# Patient Record
Sex: MALE | Race: WHITE | NOT HISPANIC OR LATINO | Employment: FULL TIME | URBAN - METROPOLITAN AREA
[De-identification: names, ages, dates, MRNs, and addresses within clinical notes are randomized per-mention and may not be internally consistent; named-entity substitution may affect disease eponyms.]

---

## 2017-01-10 ENCOUNTER — GENERIC CONVERSION - ENCOUNTER (OUTPATIENT)
Dept: OTHER | Facility: OTHER | Age: 57
End: 2017-01-10

## 2017-01-11 ENCOUNTER — GENERIC CONVERSION - ENCOUNTER (OUTPATIENT)
Dept: OTHER | Facility: OTHER | Age: 57
End: 2017-01-11

## 2017-01-11 LAB
A/G RATIO (HISTORICAL): 1.6 (ref 1.1–2.5)
ALBUMIN SERPL BCP-MCNC: 4.4 G/DL (ref 3.5–5.5)
ALP SERPL-CCNC: 79 IU/L (ref 39–117)
ALT SERPL W P-5'-P-CCNC: 35 IU/L (ref 0–44)
AMBIG ABBREV CMP14 DEFAULT (HISTORICAL): NORMAL
AMBIG ABBREV LP DEFAULT (HISTORICAL): NORMAL
AST SERPL W P-5'-P-CCNC: 29 IU/L (ref 0–40)
BILIRUB SERPL-MCNC: 0.6 MG/DL (ref 0–1.2)
BUN SERPL-MCNC: 13 MG/DL (ref 6–24)
BUN/CREA RATIO (HISTORICAL): 19 (ref 9–20)
CALCIUM SERPL-MCNC: 9.4 MG/DL (ref 8.7–10.2)
CHLORIDE SERPL-SCNC: 98 MMOL/L (ref 96–106)
CHOLEST SERPL-MCNC: 209 MG/DL (ref 100–199)
CO2 SERPL-SCNC: 22 MMOL/L (ref 18–29)
CREAT SERPL-MCNC: 0.68 MG/DL (ref 0.76–1.27)
DEPRECATED RDW RBC AUTO: 12.8 % (ref 12.3–15.4)
EGFR AFRICAN AMERICAN (HISTORICAL): 123 ML/MIN/1.73
EGFR-AMERICAN CALC (HISTORICAL): 106 ML/MIN/1.73
GLUCOSE SERPL-MCNC: 121 MG/DL (ref 65–99)
HCT VFR BLD AUTO: 49 % (ref 37.5–51)
HDLC SERPL-MCNC: 66 MG/DL
HGB BLD-MCNC: 16.5 G/DL (ref 12.6–17.7)
LDLC SERPL CALC-MCNC: 124 MG/DL (ref 0–99)
MCH RBC QN AUTO: 32.7 PG (ref 26.6–33)
MCHC RBC AUTO-ENTMCNC: 33.7 G/DL (ref 31.5–35.7)
MCV RBC AUTO: 97 FL (ref 79–97)
PLATELET # BLD AUTO: 233 X10E3/UL (ref 150–379)
POTASSIUM SERPL-SCNC: 4.3 MMOL/L (ref 3.5–5.2)
RBC (HISTORICAL): 5.05 X10E6/UL (ref 4.14–5.8)
SODIUM SERPL-SCNC: 136 MMOL/L (ref 134–144)
TOT. GLOBULIN, SERUM (HISTORICAL): 2.7 G/DL (ref 1.5–4.5)
TOTAL PROTEIN (HISTORICAL): 7.1 G/DL (ref 6–8.5)
TRIGL SERPL-MCNC: 95 MG/DL (ref 0–149)
VLDLC SERPL CALC-MCNC: 19 MG/DL (ref 5–40)
WBC # BLD AUTO: 6.8 X10E3/UL (ref 3.4–10.8)

## 2017-01-12 LAB
LYME 18 KD IGG (HISTORICAL): ABNORMAL
LYME 23 KD IGG (HISTORICAL): ABNORMAL
LYME 23 KD IGM (HISTORICAL): ABNORMAL
LYME 28 KD IGG (HISTORICAL): ABNORMAL
LYME 30 KD IGG (HISTORICAL): ABNORMAL
LYME 39 KD IGG (HISTORICAL): ABNORMAL
LYME 39 KD IGM (HISTORICAL): ABNORMAL
LYME 41 KD IGG (HISTORICAL): PRESENT
LYME 41 KD IGM (HISTORICAL): PRESENT
LYME 45 KD IGG (HISTORICAL): ABNORMAL
LYME 58 KD IGG (HISTORICAL): ABNORMAL
LYME 66 KD IGG (HISTORICAL): ABNORMAL
LYME 93 KD IGG (HISTORICAL): ABNORMAL
LYME IGG WB INTERP. (HISTORICAL): NEGATIVE
LYME IGG/IGM AB (HISTORICAL): <0.91 ISR (ref 0–0.9)
LYME IGM (HISTORICAL): <0.8 INDEX (ref 0–0.79)
LYME IGM WB INTERP. (HISTORICAL): NEGATIVE
PROSTATE SPECIFIC ANTIGEN (HISTORICAL): 2.7 NG/ML (ref 0–4)
TSH SERPL DL<=0.05 MIU/L-ACNC: 3.2 UIU/ML (ref 0.45–4.5)

## 2017-05-12 DIAGNOSIS — E66.9 OBESITY: ICD-10-CM

## 2017-05-12 DIAGNOSIS — R73.01 IMPAIRED FASTING GLUCOSE: ICD-10-CM

## 2018-07-24 ENCOUNTER — OFFICE VISIT (OUTPATIENT)
Dept: FAMILY MEDICINE CLINIC | Facility: CLINIC | Age: 58
End: 2018-07-24
Payer: COMMERCIAL

## 2018-07-24 VITALS
WEIGHT: 189.4 LBS | BODY MASS INDEX: 31.04 KG/M2 | SYSTOLIC BLOOD PRESSURE: 148 MMHG | TEMPERATURE: 97.9 F | DIASTOLIC BLOOD PRESSURE: 92 MMHG | RESPIRATION RATE: 16 BRPM | HEART RATE: 84 BPM

## 2018-07-24 DIAGNOSIS — L30.9 DERMATITIS: Primary | ICD-10-CM

## 2018-07-24 PROCEDURE — 99213 OFFICE O/P EST LOW 20 MIN: CPT | Performed by: NURSE PRACTITIONER

## 2018-07-24 PROCEDURE — 1036F TOBACCO NON-USER: CPT | Performed by: NURSE PRACTITIONER

## 2018-07-24 RX ORDER — PREDNISONE 10 MG/1
TABLET ORAL
Qty: 30 TABLET | Refills: 0 | Status: SHIPPED | OUTPATIENT
Start: 2018-07-24 | End: 2019-06-19

## 2018-07-24 NOTE — PROGRESS NOTES
FAMILY PRACTICE OFFICE VISIT       NAME: Amadeo Goetz  AGE: 60 y o  SEX: male       : 1960        MRN: [id number]    DATE: 2018    Assessment and Plan     Problem List Items Addressed This Visit     None      Visit Diagnoses     Dermatitis    -  Primary    Relevant Medications    predniSONE 10 mg tablet          1  Dermatitis  predniSONE 10 mg tablet     Dermatitis on knees, arms, and torso, appears allergic in origin  Recommend course of prednisone taper 40 mg for 3 days, 30 mg for 3 days, 20 mg for 3 days, and 10 mg for 3 days  May use an OTC antihistamine such as Zyrtec, Claritin, or Benadryl, and add OTC Pepcid or Zantac for itching  If rash worsens or does not resolve, instructed to call  Chief Complaint     Chief Complaint   Patient presents with    Rash     Patient is here c/o swelling and a red itchy rash on his legs, arms and torso x's 2+ wks  History of Present Illness     Patient presents today with a red, itching rash on his knees, thighs, arms, and torso  Rash began on his knees two weeks ago, and has spread  He works at a [employer]  This is a new job for him, started 4 months ago  The winery sprays fungicides, insecticides, and sulfur regularly on the jossie  He feels he may be having an allergic response an ingredient in one of these sprays  The [employer] will continue to use these sprays until harvest, in September or October  He has been using Benadryl tablets and topical intermittently  He is looking for new employment opportunities  He has not used any new products at home  Review of Systems   Review of Systems   Constitutional: Negative for chills, fatigue and fever  Respiratory: Negative  Cardiovascular: Negative  Gastrointestinal: Negative  Musculoskeletal: Negative  Skin: Positive for rash  Neurological: Negative          Active Problem List     Patient Active Problem List   Diagnosis    Disc degeneration, lumbar    Fatty liver    Hypertension    Impaired fasting glucose       Past Medical History:  No past medical history on file  Past Surgical History:  Past Surgical History:   Procedure Laterality Date    HERNIA REPAIR      Last Assessed:  12/29/16       Family History:  Family History   Problem Relation Age of Onset    Breast cancer Mother     Other Mother         Laryngeal cancer    Colon cancer Family        Social History:  Social History     Social History    Marital status: /Civil Union     Spouse name: N/A    Number of children: N/A    Years of education: N/A     Occupational History    Not on file  Social History Main Topics    Smoking status: Never Smoker    Smokeless tobacco: Never Used    Alcohol use Yes      Comment: social    Drug use: No    Sexual activity: Not on file     Other Topics Concern    Not on file     Social History Narrative    No narrative on file       I have reviewed the patient's medical history in detail; there are no changes to the history as noted in the electronic medical record  Objective     Vitals:    07/24/18 1633   BP: 148/92   Pulse: 84   Resp: 16   Temp: 97 9 °F (36 6 °C)   TempSrc: Tympanic   Weight: 85 9 kg (189 lb 6 4 oz)     Wt Readings from Last 3 Encounters:   07/24/18 85 9 kg (189 lb 6 4 oz)   12/29/16 85 kg (187 lb 6 1 oz)   07/29/13 86 7 kg (191 lb 4 oz)     Body mass index is 31 04 kg/m²  Physical Exam   Constitutional: He appears well-developed and well-nourished  No distress  HENT:   Head: Normocephalic and atraumatic  Cardiovascular: Normal rate, regular rhythm and normal heart sounds  Pulmonary/Chest: Effort normal and breath sounds normal    Skin: Rash (diffuse erythematous confluent macular papular rash on arms, knees, and torso  ) noted  Psychiatric: He has a normal mood and affect  Nursing note and vitals reviewed        ALLERGIES:  No Known Allergies    Current Medications     Current Outpatient Prescriptions   Medication Sig Dispense Refill    predniSONE 10 mg tablet Take 4 tablets for 3 days, 3 tablets for 3 days, 2 tablets for 3 days, 1 tablet for 3 days  30 tablet 0     No current facility-administered medications for this visit  Health Maintenance   There are no preventive care reminders to display for this patient  There is no immunization history on file for this patient      Jenifer Ross

## 2018-07-26 PROBLEM — R73.01 IMPAIRED FASTING GLUCOSE: Status: ACTIVE | Noted: 2017-01-12

## 2019-06-19 ENCOUNTER — TRANSCRIBE ORDERS (OUTPATIENT)
Dept: LAB | Facility: CLINIC | Age: 59
End: 2019-06-19

## 2019-06-19 ENCOUNTER — OFFICE VISIT (OUTPATIENT)
Dept: FAMILY MEDICINE CLINIC | Facility: CLINIC | Age: 59
End: 2019-06-19
Payer: COMMERCIAL

## 2019-06-19 ENCOUNTER — APPOINTMENT (OUTPATIENT)
Dept: LAB | Facility: CLINIC | Age: 59
End: 2019-06-19
Payer: COMMERCIAL

## 2019-06-19 VITALS
RESPIRATION RATE: 16 BRPM | DIASTOLIC BLOOD PRESSURE: 86 MMHG | OXYGEN SATURATION: 97 % | HEART RATE: 83 BPM | HEIGHT: 67 IN | BODY MASS INDEX: 29.88 KG/M2 | SYSTOLIC BLOOD PRESSURE: 158 MMHG | WEIGHT: 190.4 LBS | TEMPERATURE: 98.4 F

## 2019-06-19 DIAGNOSIS — Z12.5 ENCOUNTER FOR PROSTATE CANCER SCREENING: ICD-10-CM

## 2019-06-19 DIAGNOSIS — J32.9 SINUSITIS, UNSPECIFIED CHRONICITY, UNSPECIFIED LOCATION: ICD-10-CM

## 2019-06-19 DIAGNOSIS — K76.0 FATTY LIVER: ICD-10-CM

## 2019-06-19 DIAGNOSIS — I10 ESSENTIAL HYPERTENSION: ICD-10-CM

## 2019-06-19 DIAGNOSIS — L23.9 ALLERGIC DERMATITIS: Primary | ICD-10-CM

## 2019-06-19 DIAGNOSIS — R73.01 IMPAIRED FASTING GLUCOSE: ICD-10-CM

## 2019-06-19 LAB
ALBUMIN SERPL BCP-MCNC: 4.1 G/DL (ref 3.5–5)
ALP SERPL-CCNC: 97 U/L (ref 46–116)
ALT SERPL W P-5'-P-CCNC: 64 U/L (ref 12–78)
ANION GAP SERPL CALCULATED.3IONS-SCNC: 5 MMOL/L (ref 4–13)
AST SERPL W P-5'-P-CCNC: 33 U/L (ref 5–45)
BASOPHILS # BLD AUTO: 0.05 THOUSANDS/ΜL (ref 0–0.1)
BASOPHILS NFR BLD AUTO: 1 % (ref 0–1)
BILIRUB SERPL-MCNC: 0.59 MG/DL (ref 0.2–1)
BUN SERPL-MCNC: 14 MG/DL (ref 5–25)
CALCIUM SERPL-MCNC: 9.1 MG/DL (ref 8.3–10.1)
CHLORIDE SERPL-SCNC: 103 MMOL/L (ref 100–108)
CHOLEST SERPL-MCNC: 184 MG/DL (ref 50–200)
CO2 SERPL-SCNC: 27 MMOL/L (ref 21–32)
CREAT SERPL-MCNC: 0.84 MG/DL (ref 0.6–1.3)
EOSINOPHIL # BLD AUTO: 0.04 THOUSAND/ΜL (ref 0–0.61)
EOSINOPHIL NFR BLD AUTO: 1 % (ref 0–6)
ERYTHROCYTE [DISTWIDTH] IN BLOOD BY AUTOMATED COUNT: 11.7 % (ref 11.6–15.1)
EST. AVERAGE GLUCOSE BLD GHB EST-MCNC: 140 MG/DL
GFR SERPL CREATININE-BSD FRML MDRD: 95 ML/MIN/1.73SQ M
GLUCOSE SERPL-MCNC: 129 MG/DL (ref 65–140)
HBA1C MFR BLD: 6.5 % (ref 4.2–6.3)
HCT VFR BLD AUTO: 47.7 % (ref 36.5–49.3)
HDLC SERPL-MCNC: 69 MG/DL (ref 40–60)
HGB BLD-MCNC: 16.5 G/DL (ref 12–17)
IMM GRANULOCYTES # BLD AUTO: 0.03 THOUSAND/UL (ref 0–0.2)
IMM GRANULOCYTES NFR BLD AUTO: 0 % (ref 0–2)
LDLC SERPL CALC-MCNC: 108 MG/DL (ref 0–100)
LYMPHOCYTES # BLD AUTO: 1.84 THOUSANDS/ΜL (ref 0.6–4.47)
LYMPHOCYTES NFR BLD AUTO: 25 % (ref 14–44)
MCH RBC QN AUTO: 33.4 PG (ref 26.8–34.3)
MCHC RBC AUTO-ENTMCNC: 34.6 G/DL (ref 31.4–37.4)
MCV RBC AUTO: 97 FL (ref 82–98)
MONOCYTES # BLD AUTO: 0.62 THOUSAND/ΜL (ref 0.17–1.22)
MONOCYTES NFR BLD AUTO: 9 % (ref 4–12)
NEUTROPHILS # BLD AUTO: 4.68 THOUSANDS/ΜL (ref 1.85–7.62)
NEUTS SEG NFR BLD AUTO: 64 % (ref 43–75)
NRBC BLD AUTO-RTO: 0 /100 WBCS
PLATELET # BLD AUTO: 222 THOUSANDS/UL (ref 149–390)
PMV BLD AUTO: 10.4 FL (ref 8.9–12.7)
POTASSIUM SERPL-SCNC: 4.4 MMOL/L (ref 3.5–5.3)
PROT SERPL-MCNC: 8.1 G/DL (ref 6.4–8.2)
PSA SERPL-MCNC: 2.3 NG/ML (ref 0–4)
RBC # BLD AUTO: 4.94 MILLION/UL (ref 3.88–5.62)
SODIUM SERPL-SCNC: 135 MMOL/L (ref 136–145)
TRIGL SERPL-MCNC: 37 MG/DL
TSH SERPL DL<=0.05 MIU/L-ACNC: 1.97 UIU/ML (ref 0.36–3.74)
WBC # BLD AUTO: 7.26 THOUSAND/UL (ref 4.31–10.16)

## 2019-06-19 PROCEDURE — 99213 OFFICE O/P EST LOW 20 MIN: CPT | Performed by: FAMILY MEDICINE

## 2019-06-19 PROCEDURE — G0103 PSA SCREENING: HCPCS

## 2019-06-19 PROCEDURE — 85025 COMPLETE CBC W/AUTO DIFF WBC: CPT

## 2019-06-19 PROCEDURE — 3008F BODY MASS INDEX DOCD: CPT | Performed by: FAMILY MEDICINE

## 2019-06-19 PROCEDURE — 36415 COLL VENOUS BLD VENIPUNCTURE: CPT

## 2019-06-19 PROCEDURE — 80053 COMPREHEN METABOLIC PANEL: CPT

## 2019-06-19 PROCEDURE — 84443 ASSAY THYROID STIM HORMONE: CPT

## 2019-06-19 PROCEDURE — 80061 LIPID PANEL: CPT

## 2019-06-19 PROCEDURE — 83036 HEMOGLOBIN GLYCOSYLATED A1C: CPT

## 2019-06-19 RX ORDER — FLUTICASONE PROPIONATE 50 MCG
2 SPRAY, SUSPENSION (ML) NASAL DAILY
Qty: 1 BOTTLE | Refills: 2 | Status: SHIPPED | OUTPATIENT
Start: 2019-06-19 | End: 2021-03-22

## 2019-06-19 RX ORDER — MOMETASONE FUROATE 1 MG/ML
SOLUTION TOPICAL 2 TIMES DAILY
Qty: 60 ML | Refills: 1 | Status: SHIPPED | OUTPATIENT
Start: 2019-06-19 | End: 2021-03-22

## 2019-06-19 RX ORDER — LEVOCETIRIZINE DIHYDROCHLORIDE 5 MG/1
5 TABLET, FILM COATED ORAL EVERY EVENING
Qty: 30 TABLET | Refills: 2 | Status: SHIPPED | OUTPATIENT
Start: 2019-06-19 | End: 2021-03-22

## 2019-06-19 RX ORDER — AMOXICILLIN 875 MG/1
875 TABLET, COATED ORAL 2 TIMES DAILY
Qty: 20 TABLET | Refills: 0 | Status: SHIPPED | OUTPATIENT
Start: 2019-06-19 | End: 2019-06-29

## 2019-07-24 DIAGNOSIS — Z12.11 SCREENING FOR COLON CANCER: Primary | ICD-10-CM

## 2021-03-22 ENCOUNTER — OFFICE VISIT (OUTPATIENT)
Dept: FAMILY MEDICINE CLINIC | Facility: CLINIC | Age: 61
End: 2021-03-22
Payer: COMMERCIAL

## 2021-03-22 VITALS
WEIGHT: 192.8 LBS | RESPIRATION RATE: 16 BRPM | DIASTOLIC BLOOD PRESSURE: 82 MMHG | BODY MASS INDEX: 30.98 KG/M2 | HEART RATE: 86 BPM | TEMPERATURE: 97.8 F | OXYGEN SATURATION: 98 % | SYSTOLIC BLOOD PRESSURE: 126 MMHG | HEIGHT: 66 IN

## 2021-03-22 DIAGNOSIS — Z12.11 SCREENING FOR COLON CANCER: ICD-10-CM

## 2021-03-22 DIAGNOSIS — K76.0 FATTY LIVER: ICD-10-CM

## 2021-03-22 DIAGNOSIS — Z12.5 ENCOUNTER FOR PROSTATE CANCER SCREENING: ICD-10-CM

## 2021-03-22 DIAGNOSIS — I10 ESSENTIAL HYPERTENSION: ICD-10-CM

## 2021-03-22 DIAGNOSIS — Z00.00 ENCOUNTER FOR HEALTH MAINTENANCE EXAMINATION IN ADULT: Primary | ICD-10-CM

## 2021-03-22 DIAGNOSIS — R29.898 HAND WEAKNESS: ICD-10-CM

## 2021-03-22 DIAGNOSIS — R73.01 IMPAIRED FASTING GLUCOSE: ICD-10-CM

## 2021-03-22 DIAGNOSIS — S76.211A INGUINAL STRAIN, RIGHT, INITIAL ENCOUNTER: ICD-10-CM

## 2021-03-22 PROCEDURE — 3008F BODY MASS INDEX DOCD: CPT | Performed by: FAMILY MEDICINE

## 2021-03-22 PROCEDURE — 99396 PREV VISIT EST AGE 40-64: CPT | Performed by: FAMILY MEDICINE

## 2021-03-22 PROCEDURE — 1036F TOBACCO NON-USER: CPT | Performed by: FAMILY MEDICINE

## 2021-03-22 PROCEDURE — 3725F SCREEN DEPRESSION PERFORMED: CPT | Performed by: FAMILY MEDICINE

## 2021-03-22 RX ORDER — MELOXICAM 15 MG/1
TABLET ORAL
Qty: 30 TABLET | Refills: 1 | Status: SHIPPED | OUTPATIENT
Start: 2021-03-22 | End: 2022-06-07 | Stop reason: ALTCHOICE

## 2021-03-22 NOTE — ASSESSMENT & PLAN NOTE
· No medications  · Blood pressure is normal during exam today    · Continue home monitoring, weight loss, exercise, lifestyle changes

## 2021-03-22 NOTE — ASSESSMENT & PLAN NOTE
· Hemoglobin A1c 6 5 back in June of 2019  · Stable weight  · No family history of diabetes    · Patient will proceed with repeat blood work now

## 2021-03-22 NOTE — PROGRESS NOTES
FAMILY PRACTICE OFFICE VISIT       NAME: Noemy Mendoza  AGE: 57 y o  SEX: male       : 1960        MRN: [id number]        Assessment and Plan     Problem List Items Addressed This Visit        Digestive    Fatty liver    Relevant Orders    Comprehensive metabolic panel    Lipid panel       Endocrine    Impaired fasting glucose     · Hemoglobin A1c 6 5 back in 2019  · Stable weight  · No family history of diabetes  · Patient will proceed with repeat blood work now         Relevant Orders    Hemoglobin A1C       Cardiovascular and Mediastinum    Hypertension     · No medications  · Blood pressure is normal during exam today  · Continue home monitoring, weight loss, exercise, lifestyle changes         Relevant Orders    TSH, 3rd generation    CBC and differential      Other Visit Diagnoses     Encounter for health maintenance examination in adult    -  Primary    Relevant Orders    PSA, Total Screen    Hand weakness        Relevant Orders    EMG 2 Limb Upper Extremity    Encounter for prostate cancer screening        Screening for colon cancer        Relevant Orders    Ambulatory referral to Colorectal Surgery    Inguinal strain, right, initial encounter        Relevant Medications    meloxicam (MOBIC) 15 mg tablet      Patient presents for annual well exam   Assessment and plan as outlined above  ·  Patient will proceed with blood work  · Patient is due for routine colonoscopy, referral provided  · Patient reports intermittent symptoms of hand weakness, no significant arthritic changes on exam today, his symptoms could be related to carpal tunnel syndrome, he will proceed with EMG if symptoms persist  · Right groin strain:  No evidence of hernia on exam   Trial of meloxicam 15 mg daily for 10-14 days and then as needed  We discussed importance of avoiding of lifting which is heart to achieve with patient's job responsibilities    He will do his best   If symptoms persist-he will contact me and will proceed with further evaluation  · We discussed importance of weight loss, healthy diet especially in the light of history of borderline hemoglobin A1c of 6 5 in the past     BMI Counseling: Body mass index is 31 12 kg/m²  The BMI is above normal  Nutrition recommendations include encouraging healthy choices of fruits and vegetables, consuming healthier snacks, moderation in carbohydrate intake and reducing intake of cholesterol  Exercise recommendations include exercising 3-5 times per week  No pharmacotherapy was ordered  There are no Patient Instructions on file for this visit  Discussed with the patient and all questioned fully answered  He will call me if any problems arise  M*Modal software was used to dictate this note  It may contain errors with dictating incorrect words/spelling  Please contact provider directly with any questions  Chief Complaint     Chief Complaint   Patient presents with    Annual Exam     possible hernia        History of Present Illness     Patient presents for annual well exam   He is complaining of intermittent pain in the right groin area, 2 months, on and off, no abdominal pain, nausea vomiting, diarrhea or dyspepsia  Normal bowel movements  He denies symptoms of dysuria or hematuria  He has not appreciate any bulge or hernia  Symptoms come and go  Patient reports we can not urinary stream within past few years  Occasional symptoms of nocturia when he wakes up once a night  Patient admits to recurrent heavy lifting at work  He denies symptoms of hip pain or low back pain  Pain is usually localized in the mid right groin area and does not radiate elsewhere  Patient works at [employer] and will be planning to proceed with [place] vaccination through work  Patient has been monitoring blood pressures at home, usually they are well controlled  Most recent blood work performed in June of 2019 revealing hemoglobin A1c of 6 5    Patient denies family history of diabetes  His weight has been stable within past few years  Patient admits to arthritic changes in his fingers and hands, occasionally he has noticed weakness in his hands with opening the jars  He denies symptoms of numbness, tingling or paresthesias, no nocturnal discomfort or numbness  Review of Systems   Review of Systems   Constitutional: Negative  HENT: Negative  Eyes: Negative  Respiratory: Negative  Cardiovascular: Negative for chest pain, palpitations and leg swelling  Gastrointestinal: Negative for abdominal pain, blood in stool, constipation and diarrhea  Endocrine: Negative  Genitourinary: Negative  Musculoskeletal: Positive for arthralgias  Skin: Negative  Allergic/Immunologic: Negative  Neurological: Positive for weakness (Bilateral hands)  Hematological: Negative  Psychiatric/Behavioral: Negative  Active Problem List     Patient Active Problem List   Diagnosis    Disc degeneration, lumbar    Fatty liver    Hypertension    Impaired fasting glucose       Past Medical History:  History reviewed  No pertinent past medical history      Past Surgical History:  Past Surgical History:   Procedure Laterality Date    HERNIA REPAIR      Last Assessed:  12/29/16       Family History:  Family History   Problem Relation Age of Onset    Breast cancer Mother     Other Mother         Laryngeal cancer    Colon cancer Family        Social History:  Social History     Socioeconomic History    Marital status: /Civil Union     Spouse name: Not on file    Number of children: Not on file    Years of education: Not on file    Highest education level: Not on file   Occupational History    Not on file   Social Needs    Financial resource strain: Not on file    Food insecurity     Worry: Not on file     Inability: Not on file    Transportation needs     Medical: Not on file     Non-medical: Not on file   Tobacco Use    Smoking status: Never Smoker    Smokeless tobacco: Never Used   Substance and Sexual Activity    Alcohol use: Yes     Comment: social    Drug use: No    Sexual activity: Not on file   Lifestyle    Physical activity     Days per week: Not on file     Minutes per session: Not on file    Stress: Not on file   Relationships    Social connections     Talks on phone: Not on file     Gets together: Not on file     Attends [employer] service: Not on file     Active member of club or organization: Not on file     Attends meetings of clubs or organizations: Not on file     Relationship status: Not on file    Intimate partner violence     Fear of current or ex partner: Not on file     Emotionally abused: Not on file     Physically abused: Not on file     Forced sexual activity: Not on file   Other Topics Concern    Not on file   Social History Narrative    Not on file           Objective     Vitals:    03/22/21 0724 03/22/21 0754   BP: 136/84 126/82   BP Location: Left arm    Patient Position: Sitting    Cuff Size: Large    Pulse: 86    Resp: 16    Temp: 97 8 °F (36 6 °C)    TempSrc: Temporal    SpO2: 98%    Weight: 87 5 kg (192 lb 12 8 oz)    Height: 5' 6" (1 676 m)      Wt Readings from Last 3 Encounters:   03/22/21 87 5 kg (192 lb 12 8 oz)   06/19/19 86 4 kg (190 lb 6 4 oz)   07/24/18 85 9 kg (189 lb 6 4 oz)       Physical Exam  Vitals signs and nursing note reviewed  Constitutional:       General: He is not in acute distress  Appearance: Normal appearance  He is well-developed  He is obese  He is not ill-appearing  HENT:      Head: Normocephalic and atraumatic  Eyes:      Conjunctiva/sclera: Conjunctivae normal    Neck:      Musculoskeletal: Neck supple  Vascular: No carotid bruit  Cardiovascular:      Rate and Rhythm: Normal rate and regular rhythm  Heart sounds: Normal heart sounds  No murmur  Pulmonary:      Effort: Pulmonary effort is normal  No respiratory distress        Breath sounds: Normal breath sounds  No wheezing or rales  Abdominal:      General: Abdomen is flat  Bowel sounds are normal  There is no distension or abdominal bruit  Palpations: There is no mass  Tenderness: There is no abdominal tenderness  There is no guarding  Hernia: No hernia is present  Musculoskeletal: Normal range of motion  Neurological:      Mental Status: He is alert and oriented to person, place, and time  Cranial Nerves: No cranial nerve deficit  Psychiatric:         Mood and Affect: Mood normal          Behavior: Behavior normal          Thought Content:  Thought content normal          Pertinent Laboratory/Diagnostic Studies:  Lab Results   Component Value Date    GLUCOSE 121 (H) 01/11/2017    BUN 14 06/19/2019    CREATININE 0 84 06/19/2019    CALCIUM 9 1 06/19/2019     01/11/2017    K 4 4 06/19/2019    CO2 27 06/19/2019     06/19/2019     Lab Results   Component Value Date    ALT 64 06/19/2019    AST 33 06/19/2019    ALKPHOS 97 06/19/2019    BILITOT 0 6 01/11/2017       Lab Results   Component Value Date    WBC 7 26 06/19/2019    HGB 16 5 06/19/2019    HCT 47 7 06/19/2019    MCV 97 06/19/2019     06/19/2019       No results found for: TSH    Lab Results   Component Value Date    CHOL 209 (H) 01/11/2017     Lab Results   Component Value Date    TRIG 37 06/19/2019     Lab Results   Component Value Date    HDL 69 (H) 06/19/2019     Lab Results   Component Value Date    LDLCALC 108 (H) 06/19/2019     Lab Results   Component Value Date    HGBA1C 6 5 (H) 06/19/2019       Results for orders placed or performed in visit on 03/10/21   Novel Coronavirus (COVID-19), PCR [facility]    Specimen: Nose; Nares   Result Value Ref Range    SARS-CoV-2 Negative Negative       Orders Placed This Encounter   Procedures    Comprehensive metabolic panel    Lipid panel    TSH, 3rd generation    PSA, Total Screen    CBC and differential    Hemoglobin A1C    Ambulatory referral to Colorectal Surgery    EMG 2 Limb Upper Extremity       ALLERGIES:  No Known Allergies    Current Medications     Current Outpatient Medications   Medication Sig Dispense Refill    meloxicam (MOBIC) 15 mg tablet Take 1 tablet once a day after food as needed for groin pain/joint pain 30 tablet 1     No current facility-administered medications for this visit  Medications Discontinued During This Encounter   Medication Reason    mometasone (ELOCON) 0 1 % lotion     levocetirizine (XYZAL) 5 MG tablet     fluticasone (FLONASE) 50 mcg/act nasal spray        Health Maintenance     Health Maintenance   Topic Date Due    Hepatitis C Screening  Never done    HIV Screening  Never done    BMI: Followup Plan  Never done    Annual Physical  Never done    DTaP,Tdap,and Td Vaccines (1 - Tdap) Never done    Colorectal Cancer Screening  Never done    COVID-19 Vaccine (2 - Pfizer 2-dose series) 02/17/2021    Influenza Vaccine (1) 06/30/2021 (Originally 9/1/2020)    Depression Screening PHQ  03/22/2022    BMI: Adult  03/22/2022    Pneumococcal Vaccine: Pediatrics (0 to 5 Years) and At-Risk Patients (6 to 59 Years)  Aged Out    HIB Vaccine  Aged Out    Hepatitis B Vaccine  Aged Out    IPV Vaccine  Aged Out    Hepatitis A Vaccine  Aged Out    Meningococcal ACWY Vaccine  Aged Out    HPV Vaccine  Aged Out       There is no immunization history for the selected administration types on file for this patient      Gisselle Menezes MD

## 2021-03-25 ENCOUNTER — OCCMED (OUTPATIENT)
Dept: URGENT CARE | Facility: CLINIC | Age: 61
End: 2021-03-25
Payer: OTHER MISCELLANEOUS
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
## 2021-03-25 PROCEDURE — 99283 EMERGENCY DEPT VISIT LOW MDM: CPT | Performed by: PHYSICIAN ASSISTANT

## 2021-03-25 PROCEDURE — G0382 LEV 3 HOSP TYPE B ED VISIT: HCPCS | Performed by: PHYSICIAN ASSISTANT

## 2021-03-29 ENCOUNTER — APPOINTMENT (OUTPATIENT)
Dept: URGENT CARE | Facility: CLINIC | Age: 61
End: 2021-03-29

## 2021-03-29 ENCOUNTER — APPOINTMENT (OUTPATIENT)
Dept: LAB | Facility: CLINIC | Age: 61
End: 2021-03-29
Payer: COMMERCIAL

## 2021-03-29 DIAGNOSIS — Z00.00 ENCOUNTER FOR HEALTH MAINTENANCE EXAMINATION IN ADULT: ICD-10-CM

## 2021-03-29 DIAGNOSIS — K76.0 FATTY LIVER: ICD-10-CM

## 2021-03-29 DIAGNOSIS — R73.01 IMPAIRED FASTING GLUCOSE: ICD-10-CM

## 2021-03-29 DIAGNOSIS — I10 ESSENTIAL HYPERTENSION: ICD-10-CM

## 2021-03-29 LAB
ALBUMIN SERPL BCP-MCNC: 3.8 G/DL (ref 3.5–5)
ALP SERPL-CCNC: 94 U/L (ref 46–116)
ALT SERPL W P-5'-P-CCNC: 52 U/L (ref 12–78)
ANION GAP SERPL CALCULATED.3IONS-SCNC: 5 MMOL/L (ref 4–13)
AST SERPL W P-5'-P-CCNC: 28 U/L (ref 5–45)
BASOPHILS # BLD AUTO: 0.05 THOUSANDS/ΜL (ref 0–0.1)
BASOPHILS NFR BLD AUTO: 1 % (ref 0–1)
BILIRUB SERPL-MCNC: 0.8 MG/DL (ref 0.2–1)
BUN SERPL-MCNC: 15 MG/DL (ref 5–25)
CALCIUM SERPL-MCNC: 8.7 MG/DL (ref 8.3–10.1)
CHLORIDE SERPL-SCNC: 106 MMOL/L (ref 100–108)
CHOLEST SERPL-MCNC: 172 MG/DL (ref 50–200)
CO2 SERPL-SCNC: 27 MMOL/L (ref 21–32)
CREAT SERPL-MCNC: 0.76 MG/DL (ref 0.6–1.3)
EOSINOPHIL # BLD AUTO: 0.15 THOUSAND/ΜL (ref 0–0.61)
EOSINOPHIL NFR BLD AUTO: 2 % (ref 0–6)
ERYTHROCYTE [DISTWIDTH] IN BLOOD BY AUTOMATED COUNT: 11.7 % (ref 11.6–15.1)
EST. AVERAGE GLUCOSE BLD GHB EST-MCNC: 160 MG/DL
GFR SERPL CREATININE-BSD FRML MDRD: 98 ML/MIN/1.73SQ M
GLUCOSE P FAST SERPL-MCNC: 161 MG/DL (ref 65–99)
HBA1C MFR BLD: 7.2 %
HCT VFR BLD AUTO: 46.8 % (ref 36.5–49.3)
HDLC SERPL-MCNC: 62 MG/DL
HGB BLD-MCNC: 16 G/DL (ref 12–17)
IMM GRANULOCYTES # BLD AUTO: 0.03 THOUSAND/UL (ref 0–0.2)
IMM GRANULOCYTES NFR BLD AUTO: 0 % (ref 0–2)
LDLC SERPL CALC-MCNC: 97 MG/DL (ref 0–100)
LYMPHOCYTES # BLD AUTO: 2.71 THOUSANDS/ΜL (ref 0.6–4.47)
LYMPHOCYTES NFR BLD AUTO: 37 % (ref 14–44)
MCH RBC QN AUTO: 32.5 PG (ref 26.8–34.3)
MCHC RBC AUTO-ENTMCNC: 34.2 G/DL (ref 31.4–37.4)
MCV RBC AUTO: 95 FL (ref 82–98)
MONOCYTES # BLD AUTO: 0.71 THOUSAND/ΜL (ref 0.17–1.22)
MONOCYTES NFR BLD AUTO: 10 % (ref 4–12)
NEUTROPHILS # BLD AUTO: 3.74 THOUSANDS/ΜL (ref 1.85–7.62)
NEUTS SEG NFR BLD AUTO: 50 % (ref 43–75)
NONHDLC SERPL-MCNC: 110 MG/DL
NRBC BLD AUTO-RTO: 0 /100 WBCS
PLATELET # BLD AUTO: 217 THOUSANDS/UL (ref 149–390)
PMV BLD AUTO: 10.5 FL (ref 8.9–12.7)
POTASSIUM SERPL-SCNC: 4.3 MMOL/L (ref 3.5–5.3)
PROT SERPL-MCNC: 7.5 G/DL (ref 6.4–8.2)
PSA SERPL-MCNC: 2.8 NG/ML (ref 0–4)
RBC # BLD AUTO: 4.93 MILLION/UL (ref 3.88–5.62)
SODIUM SERPL-SCNC: 138 MMOL/L (ref 136–145)
TRIGL SERPL-MCNC: 65 MG/DL
TSH SERPL DL<=0.05 MIU/L-ACNC: 2.95 UIU/ML (ref 0.36–3.74)
WBC # BLD AUTO: 7.39 THOUSAND/UL (ref 4.31–10.16)

## 2021-03-29 PROCEDURE — 84443 ASSAY THYROID STIM HORMONE: CPT

## 2021-03-29 PROCEDURE — 85025 COMPLETE CBC W/AUTO DIFF WBC: CPT

## 2021-03-29 PROCEDURE — 99213 OFFICE O/P EST LOW 20 MIN: CPT

## 2021-03-29 PROCEDURE — 80061 LIPID PANEL: CPT

## 2021-03-29 PROCEDURE — G0103 PSA SCREENING: HCPCS

## 2021-03-29 PROCEDURE — 36415 COLL VENOUS BLD VENIPUNCTURE: CPT

## 2021-03-29 PROCEDURE — 83036 HEMOGLOBIN GLYCOSYLATED A1C: CPT

## 2021-03-29 PROCEDURE — 80053 COMPREHEN METABOLIC PANEL: CPT

## 2021-03-30 ENCOUNTER — TELEPHONE (OUTPATIENT)
Dept: FAMILY MEDICINE CLINIC | Facility: CLINIC | Age: 61
End: 2021-03-30

## 2021-03-30 DIAGNOSIS — R73.01 IMPAIRED FASTING GLUCOSE: Primary | ICD-10-CM

## 2021-03-30 NOTE — TELEPHONE ENCOUNTER
I reviewed results of blood work  Elevated fasting blood glucose at 161 and hemoglobin A1c of 7 2  Rest of the blood work including CBC, CMP, lipid panel, TSH and PSA were normal   I left message for patient  last nightto call me back to review

## 2021-04-02 ENCOUNTER — OCCMED (OUTPATIENT)
Dept: URGENT CARE | Facility: CLINIC | Age: 61
End: 2021-04-02
Payer: OTHER MISCELLANEOUS
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
## 2021-04-02 PROCEDURE — 99213 OFFICE O/P EST LOW 20 MIN: CPT | Performed by: PHYSICIAN ASSISTANT

## 2021-04-07 ENCOUNTER — APPOINTMENT (OUTPATIENT)
Dept: URGENT CARE | Facility: CLINIC | Age: 61
End: 2021-04-07
Payer: OTHER MISCELLANEOUS

## 2021-04-07 PROCEDURE — 99213 OFFICE O/P EST LOW 20 MIN: CPT

## 2021-04-08 ENCOUNTER — EVALUATION (OUTPATIENT)
Dept: PHYSICAL THERAPY | Facility: CLINIC | Age: 61
End: 2021-04-08
Payer: OTHER MISCELLANEOUS

## 2021-04-08 DIAGNOSIS — M75.41 IMPINGEMENT SYNDROME OF RIGHT SHOULDER: Primary | ICD-10-CM

## 2021-04-08 DIAGNOSIS — M25.511 ACUTE PAIN OF RIGHT SHOULDER: ICD-10-CM

## 2021-04-08 PROCEDURE — 97161 PT EVAL LOW COMPLEX 20 MIN: CPT

## 2021-04-08 NOTE — TELEPHONE ENCOUNTER
Patient made aware of result note and providers instructions  Patient verbalized understanding       Patient states he can be reached at [phone] after 5 pm     Dr Cross please review and advise

## 2021-04-08 NOTE — TELEPHONE ENCOUNTER
I spoke with patient regarding blood work results  All blood work is excellent aside from hemoglobin A1c of 7 2  Patient is determined to lose weight, start healthy diet and improve his numbers with lifestyle changes  He is reluctant to take any medications  I advised him to schedule diabetic eye exam and repeat blood work in 3 months  Patient understands instructions and agrees

## 2021-04-08 NOTE — PROGRESS NOTES
PT Evaluation     Today's date: 2021  Patient name: Mayra Mcmanus  : 1960  MRN: [id number]  Referring provider: Kimberlyn Child MD  Dx:   Encounter Diagnosis     ICD-10-CM    1  Impingement syndrome of right shoulder  M75 41    2  Acute pain of right shoulder  M25 511        Start Time: 1745  Stop Time: 1830  Total time in clinic (min): 45 minutes    Assessment  Assessment details: Pt is a 57 y o  male presenting to PT with acute R shoulder pain s/p attempting to lift pallet  PT findings include: positive impingement special tests as well as painful arc sign  Pt with no significant tenderness to palpation of posterior RTC, however does present with painful and weak resisted isometric of infraspinatus and teres minor  Pt likely with RTC involvement in pain  Cannot rule out ligament involvement however less likely due to no subjective of instability, more likely strain  Pt will benefit from skilled PT to address above impairments to return to PLOF with less restriction and to reach functional goals  Impairments: impaired physical strength, lacks appropriate home exercise program and pain with function  Functional limitations: pain with lifting, pain with reahcing overhead  Pain with pushing/working UE ADLs  Symptom irritability: moderateUnderstanding of Dx/Px/POC: good   Prognosis: good    Goals  1  Pt will demonstrate understanding of HEP and POC in order to improve compliance with course of rehab in 2 weeks  2  Pt will demonstrate awareness of appropriate posture with seated, standing and functional dynamic reaching activities in order to decrease excessive loads/pain with ADL's in 3 weeks  3  Pt will demonstrate 5/5 ER strength in order for pt to perform overhead work activities with less restriction by d/c    4  Pt will demonstrate negative painful arc sign so that pt can perform lifting activities without restriction by d/c       Plan  Patient would benefit from: skilled physical therapy  Planned modality interventions: low level laser therapy  Planned therapy interventions: manual therapy, neuromuscular re-education, patient education, postural training, stretching, strengthening, therapeutic activities, therapeutic exercise and home exercise program  Frequency: 2x week  Duration in weeks: 4  Treatment plan discussed with: patient        Subjective Evaluation    History of Present Illness  Mechanism of injury: Pt reports that he works as  for [employer]  At work pt was lifting pallet and onset of sharp burning R shoulder pain into the bicep region  Pt states that he subsequently had diffficulty lifting arm without pain  Since that injury, he states slight improvement  Able to lift arms with pain but able to go overhead  Pt visited physician who was concerned as he had weakness with ER  Pt states that physician would like him to get MRI  Pt has yet to schedule MRI  He denies numbness/tingling  No longer gets burning pain in the anterior shoulder  Pt with prior injury to R shoulder in which he pulled bicep mm  About 25 years ago  Quality of life: good    Pain  Current pain ratin  At best pain ratin  At worst pain ratin  Location: R shoulder  Quality: sharp, burning, radiating, throbbing and dull ache  Relieving factors: relaxation and rest  Aggravating factors: lifting    Patient Goals  Patient goals for therapy: decreased pain, increased strength, independence with ADLs/IADLs, return to work and increased motion          Objective    ROM (R/L)  Shoulder Flex: WNL  Shoulder ABD: WNL  Shoulder ER: WNL  Shoulder IR: WNL    Joint Mobility (R/L):  GH Inferior: normal/normal  GH Posterior: normal/normal  GH Anterior: normal/normal    Palpation: +TTP anterior shoulder, bicipital groove region, intertuburcular area   +soft tissue tension R UT     Strength:  Shoulder Flex: 4/5 (pain); 5/5  Shoulder ABD: 4/5 (pain); 5/5  Shoulder IR: 4/5 (pain); 5/5  Shoulder ER: 3+/5 (pain); 5/5      Special Tests:  Neer +/-  Eugune Christians +/-  + painful arc sign     Precautions: None      Manuals 4/8            UT/levator STM             Laser                                        Neuro Re-Ed                                                                                                        Ther Ex             Isometric shoulder ER/IR/add HEP demo            Shoulder Row HEP demo blue            Shoulder Ext Hep demo blue            Bicep Curl             S/l ER             Shoulder IR             Serratus punch             Zoltan             Ther Activity             UBE                          Gait Training                                       Modalities

## 2021-04-08 NOTE — TELEPHONE ENCOUNTER
Please contact patient  I have been trying to region within past 10 days unsuccessfully and left him messages  His blood work reveals new onset of diabetes with hemoglobin A1c of 7 2  I do need to see him for a follow-up or discussed results over the phone  He either needs to schedule a follow-up appointment or give me an exact time after 5:00 p m  when I can reach him and discussed results  Please let me know      Thank you

## 2021-04-13 ENCOUNTER — OFFICE VISIT (OUTPATIENT)
Dept: PHYSICAL THERAPY | Facility: CLINIC | Age: 61
End: 2021-04-13
Payer: OTHER MISCELLANEOUS

## 2021-04-13 DIAGNOSIS — M25.511 ACUTE PAIN OF RIGHT SHOULDER: ICD-10-CM

## 2021-04-13 DIAGNOSIS — M75.41 IMPINGEMENT SYNDROME OF RIGHT SHOULDER: Primary | ICD-10-CM

## 2021-04-13 PROCEDURE — 97110 THERAPEUTIC EXERCISES: CPT

## 2021-04-13 PROCEDURE — 97140 MANUAL THERAPY 1/> REGIONS: CPT

## 2021-04-13 NOTE — PROGRESS NOTES
Daily Note     Today's date: 2021  Patient name: Brenda Palma  : 1960  MRN: [id number]  Referring provider: Frederick Andino MD  Dx:   Encounter Diagnosis     ICD-10-CM    1  Impingement syndrome of right shoulder  M75 41    2  Acute pain of right shoulder  M25 511                   Subjective: Pt reports no significant changes since IE  Pt has yet to schedule MRI due to wrong phone number provided  Pt states that he will contact physician and try to obtain MRI  Objective: See treatment diary below      Assessment: pt reports subjective pain relief with fwd UBE, therefore will only perform fwd NV  Pt continues with significant pain with ER, therefore only performed isometric and AROM in s/l  PT will continue to monitor  Plan: Continue per plan of care        Precautions: None      Manuals            UT/levator STM  DL           Laser   4' R shoulder                                     Neuro Re-Ed                                                                                                        Ther Ex             Isometric shoulder ER/IR/add HEP demo isometric ER only 10x5"            Shoulder Row HEP demo blue Apollo 40# 3x10           Shoulder Ext Hep demo blue Blue 3x10            Bicep Curl             S/l ER             Shoulder IR  Grn 3x10           Serratus punch             Pulley  Flex 15x 10"           Shrug  15x NV           Ther Activity             UBE  3'/3' (fwd only NV 6')                        Gait Training                                       Modalities

## 2021-04-15 ENCOUNTER — OFFICE VISIT (OUTPATIENT)
Dept: PHYSICAL THERAPY | Facility: CLINIC | Age: 61
End: 2021-04-15
Payer: OTHER MISCELLANEOUS

## 2021-04-15 DIAGNOSIS — M75.41 IMPINGEMENT SYNDROME OF RIGHT SHOULDER: Primary | ICD-10-CM

## 2021-04-15 DIAGNOSIS — M25.511 ACUTE PAIN OF RIGHT SHOULDER: ICD-10-CM

## 2021-04-15 PROCEDURE — 97140 MANUAL THERAPY 1/> REGIONS: CPT

## 2021-04-15 PROCEDURE — 97110 THERAPEUTIC EXERCISES: CPT

## 2021-04-15 NOTE — PROGRESS NOTES
Daily Note     Today's date: 4/15/2021  Patient name: Terrie Light  : 1960  MRN: [id number]  Referring provider: Terry Martinez MD  Dx:   Encounter Diagnosis     ICD-10-CM    1  Impingement syndrome of right shoulder  M75 41    2  Acute pain of right shoulder  M25 511                   Subjective: Pt reports that performing shoulder exercises last visit felt fairly good  He denies significant pain today, continues with weakness with ER  Objective: See treatment diary below      Assessment: Pt continues to tolerate exercises well with no complaints  Significant weakness continued with ER, but able to tolerate isometric walk outs  PT will continue to monitor and progress per pt ability  Plan: Continue per plan of care        Precautions: None      Manuals 4/8 4/13 4/15          UT/levator STM  DL DL          Laser   4' R shoulder 4' R shoulder           [address] stabilization   ER/IR @ 0° ABD 5" static holds 3x30"                       Neuro Re-Ed                                                                                                        Ther Ex             Isometric shoulder ER/IR/add HEP demo isometric ER only 10x5"            Isometric Tband walk outs   ER walk outs 1 step grn 10x          Shoulder Row HEP demo blue Apollo 40# 3x10 Apollo 40# 3x10          Shoulder Ext Hep demo blue Blue 3x10  Blue 3x10           Bicep Curl   8# 15x regular curls; 15x hammer curls          S/l ER             Shoulder IR  Grn 3x10 Grn 3x10          Serratus punch   2# 2x10          Pulley  Flex 15x 10" NV          Shrug  15x NV 15x          Ther Activity             UBE  3'/3' (fwd only NV 6') 6' fwd                       Gait Training                                       Modalities

## 2021-04-19 ENCOUNTER — APPOINTMENT (OUTPATIENT)
Dept: PHYSICAL THERAPY | Facility: CLINIC | Age: 61
End: 2021-04-19
Payer: OTHER MISCELLANEOUS

## 2021-04-22 ENCOUNTER — OFFICE VISIT (OUTPATIENT)
Dept: PHYSICAL THERAPY | Facility: CLINIC | Age: 61
End: 2021-04-22
Payer: OTHER MISCELLANEOUS

## 2021-04-22 DIAGNOSIS — M75.41 IMPINGEMENT SYNDROME OF RIGHT SHOULDER: Primary | ICD-10-CM

## 2021-04-22 DIAGNOSIS — M25.511 ACUTE PAIN OF RIGHT SHOULDER: ICD-10-CM

## 2021-04-22 PROCEDURE — 97110 THERAPEUTIC EXERCISES: CPT

## 2021-04-22 PROCEDURE — 97140 MANUAL THERAPY 1/> REGIONS: CPT

## 2021-04-22 NOTE — PROGRESS NOTES
Daily Note     Today's date: 2021  Patient name: Cony Truong  : 1960  MRN: [id number]  Referring provider: Vanessa Medina MD  Dx:   Encounter Diagnosis     ICD-10-CM    1  Impingement syndrome of right shoulder  M75 41    2  Acute pain of right shoulder  M25 511                   Subjective: Pt reports his R shoulder feels "tired" upon arrival to therapy  Notes "I did a lot of digging today "    Objective: See treatment diary below      Assessment: Performed exercise program w/o difficulty or discomfort  Responded well to manual therapies  Tolerated treatment well  Patient would benefit from continued PT      Plan: Continue per plan of care        Precautions: None      Manuals 4/8 4/13 4/15 4/22         UT/levator STM  DL DL MO         Laser   4' R shoulder 4' R shoulder  4' R shoulder         [facility] stabilization   ER/IR @ 0° ABD 5" static holds 3x30" ER/IR  @0* ABD5" static holds  3x30"                      Neuro Re-Ed                                                                                                        Ther Ex             Isometric shoulder ER/IR/add HEP demo isometric ER only 10x5"            Isometric Tband walk outs   ER walk outs 1 step grn 10x ER  Walk outs 1  Step  grn 10x         Shoulder Row HEP demo blue Apollo 40# 3x10 Apollo 40# 3x10 Apollo  40#  3x10         Shoulder Ext Hep demo blue Blue 3x10  Blue 3x10  Blue  3x10         Bicep Curl   8# 15x regular curls; 15x hammer curls 8# 15x  Regular curls,  15x  hammer curls         S/l ER             Shoulder IR  Grn 3x10 Grn 3x10 Grn  3x10         Serratus punch   2# 2x10 2#2x10         Pulley  Flex 15x 10" NV flx 15x10"         Shrug  15x NV 15x  15x         Ther Activity             UBE  3'/3' (fwd only NV 6') 6' fwd 6'  fwd                      Gait Training                                       Modalities

## 2021-04-26 ENCOUNTER — APPOINTMENT (OUTPATIENT)
Dept: PHYSICAL THERAPY | Facility: CLINIC | Age: 61
End: 2021-04-26
Payer: OTHER MISCELLANEOUS

## 2021-04-29 ENCOUNTER — APPOINTMENT (OUTPATIENT)
Dept: PHYSICAL THERAPY | Facility: CLINIC | Age: 61
End: 2021-04-29
Payer: OTHER MISCELLANEOUS

## 2021-05-10 ENCOUNTER — TRANSCRIBE ORDERS (OUTPATIENT)
Dept: ADMINISTRATIVE | Facility: HOSPITAL | Age: 61
End: 2021-05-10

## 2021-05-10 DIAGNOSIS — M75.41 IMPINGEMENT SYNDROME OF RIGHT SHOULDER: Primary | ICD-10-CM

## 2021-05-17 ENCOUNTER — OFFICE VISIT (OUTPATIENT)
Dept: URGENT CARE | Facility: CLINIC | Age: 61
End: 2021-05-17
Payer: COMMERCIAL

## 2021-05-17 VITALS
RESPIRATION RATE: 20 BRPM | OXYGEN SATURATION: 95 % | TEMPERATURE: 98.7 F | WEIGHT: 190 LBS | BODY MASS INDEX: 30.53 KG/M2 | HEIGHT: 66 IN | HEART RATE: 76 BPM | DIASTOLIC BLOOD PRESSURE: 86 MMHG | SYSTOLIC BLOOD PRESSURE: 140 MMHG

## 2021-05-17 DIAGNOSIS — H92.02 EARACHE ON LEFT: Primary | ICD-10-CM

## 2021-05-17 PROCEDURE — G0382 LEV 3 HOSP TYPE B ED VISIT: HCPCS | Performed by: PHYSICIAN ASSISTANT

## 2021-05-17 PROCEDURE — S9083 URGENT CARE CENTER GLOBAL: HCPCS | Performed by: PHYSICIAN ASSISTANT

## 2021-05-17 RX ORDER — FLUTICASONE PROPIONATE 50 MCG
1 SPRAY, SUSPENSION (ML) NASAL DAILY
Qty: 9.9 ML | Refills: 0 | Status: SHIPPED | OUTPATIENT
Start: 2021-05-17 | End: 2022-03-18

## 2021-05-17 RX ORDER — PREDNISONE 10 MG/1
TABLET ORAL
Qty: 21 TABLET | Refills: 0 | Status: SHIPPED | OUTPATIENT
Start: 2021-05-17 | End: 2021-05-28 | Stop reason: ALTCHOICE

## 2021-05-17 NOTE — PROGRESS NOTES
[facility]        NAME: Estela Granger is a 57 y o  male  : 1960    MRN: [id number]  DATE: May 17, 2021  TIME: 12:45 PM    Assessment and Plan   Earache on left [H92 02]  1  Earache on left  predniSONE 10 mg tablet    fluticasone (FLONASE) 50 mcg/act nasal spray         Patient Instructions     Take prednisone as directed   Use Flonase as directed   Monitor for worsening symptoms  Follow up with PCP in 3-5 days  Proceed to  ER if symptoms worsen  Chief Complaint     Chief Complaint   Patient presents with    ear blocked     c/o bilateral ear blockage (L>R) - with diminished hearing - x 1 week  Has tried OTC preparations and H2O2 with no success  History of Present Illness       Patient presents with complaint clogged sensation in the left ear for the past week  He notes associated muffled hearing in the left ear  He denies fever, chills, sweats, ear pain, and ear drainage  He reports intermittent congestion which seems to be worse at night  He denies sinus pressure, headache, and sinus pain  Patient reports history of allergic rhinitis but denies trying any palliative measures  He also notes intermittent popping in the left ear  Review of Systems   Review of Systems   Constitutional: Negative for chills and fever  HENT: Negative for congestion, ear discharge, ear pain and sore throat  Eyes: Negative for pain and visual disturbance  Respiratory: Negative for cough and shortness of breath  Cardiovascular: Negative for chest pain and palpitations  Gastrointestinal: Negative for abdominal pain and vomiting  Genitourinary: Negative for dysuria and hematuria  Musculoskeletal: Negative for arthralgias and back pain  Skin: Negative for color change and rash  Neurological: Negative for seizures and syncope  All other systems reviewed and are negative          Current Medications       Current Outpatient Medications:     fluticasone (FLONASE) 50 mcg/act nasal spray, 1 spray into each nostril daily, Disp: 9 9 mL, Rfl: 0    meloxicam (MOBIC) 15 mg tablet, Take 1 tablet once a day after food as needed for groin pain/joint pain, Disp: 30 tablet, Rfl: 1    predniSONE 10 mg tablet, Take 6 tabs on day 1, 5 tabs on day 2, 4 tabs on day 3, 3 tabs on day 4, 2 tabs on day 5, and 1 tab on day 6, Disp: 21 tablet, Rfl: 0    Current Allergies     Allergies as of 05/17/2021    (No Known Allergies)            The following portions of the patient's history were reviewed and updated as appropriate: allergies, current medications, past family history, past medical history, past social history, past surgical history and problem list      No past medical history on file  Past Surgical History:   Procedure Laterality Date    HERNIA REPAIR      Last Assessed:  12/29/16       Family History   Problem Relation Age of Onset    Breast cancer Mother     Other Mother         Laryngeal cancer    Colon cancer Family          Medications have been verified  Objective   /86   Pulse 76   Temp 98 7 °F (37 1 °C)   Resp 20   Ht 5' 6" (1 676 m)   Wt 86 2 kg (190 lb)   SpO2 95%   BMI 30 67 kg/m²   No LMP for male patient  Physical Exam     Physical Exam  Vitals signs and nursing note reviewed  Constitutional:       General: He is not in acute distress  Appearance: Normal appearance  He is well-developed  He is not ill-appearing or diaphoretic  HENT:      Head: Normocephalic and atraumatic  Right Ear: Tympanic membrane, ear canal and external ear normal  There is no impacted cerumen  Left Ear: Tympanic membrane, ear canal and external ear normal  There is no impacted cerumen  Nose: Nose normal    Eyes:      Conjunctiva/sclera: Conjunctivae normal       Pupils: Pupils are equal, round, and reactive to light  Neck:      Musculoskeletal: Normal range of motion and neck supple  Cardiovascular:      Rate and Rhythm: Normal rate and regular rhythm        Heart sounds: Normal heart sounds  Pulmonary:      Effort: Pulmonary effort is normal  No respiratory distress  Breath sounds: Normal breath sounds  No stridor  Lymphadenopathy:      Cervical: No cervical adenopathy  Skin:     General: Skin is warm and dry  Capillary Refill: Capillary refill takes less than 2 seconds  Findings: No rash  Neurological:      Mental Status: He is alert and oriented to person, place, and time  Cranial Nerves: No cranial nerve deficit  Sensory: No sensory deficit  Psychiatric:         Behavior: Behavior normal          Thought Content:  Thought content normal

## 2021-05-21 NOTE — PROGRESS NOTES
It has been ~30 days since last PT session  Pt has not followed up with no updated measures  Pt will be discharged from skilled PT at this time

## 2021-05-28 ENCOUNTER — OFFICE VISIT (OUTPATIENT)
Dept: FAMILY MEDICINE CLINIC | Facility: CLINIC | Age: 61
End: 2021-05-28
Payer: COMMERCIAL

## 2021-05-28 VITALS
TEMPERATURE: 97.8 F | SYSTOLIC BLOOD PRESSURE: 140 MMHG | DIASTOLIC BLOOD PRESSURE: 80 MMHG | WEIGHT: 193.5 LBS | BODY MASS INDEX: 31.1 KG/M2 | HEART RATE: 75 BPM | RESPIRATION RATE: 18 BRPM | OXYGEN SATURATION: 98 % | HEIGHT: 66 IN

## 2021-05-28 DIAGNOSIS — H69.82 DYSFUNCTION OF LEFT EUSTACHIAN TUBE: Primary | ICD-10-CM

## 2021-05-28 PROCEDURE — 1036F TOBACCO NON-USER: CPT | Performed by: NURSE PRACTITIONER

## 2021-05-28 PROCEDURE — 99213 OFFICE O/P EST LOW 20 MIN: CPT | Performed by: NURSE PRACTITIONER

## 2021-05-28 PROCEDURE — 3008F BODY MASS INDEX DOCD: CPT | Performed by: NURSE PRACTITIONER

## 2021-05-28 NOTE — PROGRESS NOTES
FAMILY PRACTICE OFFICE VISIT       NAME: Martinez Gutierrez  AGE: 57 y o  SEX: male       : 1960        MRN: [id number]    Assessment and Plan     Problem List Items Addressed This Visit     None      Visit Diagnoses     Dysfunction of left eustachian tube    -  Primary    Relevant Orders    Ambulatory Referral to Otolaryngology          1  Dysfunction of left eustachian tube  Ambulatory Referral to Otolaryngology     This 57-year-old male presents today for symptoms and exam consistent left eustachian tube dysfunction  Was evaluated at urgent care  prescribed a prednisone taper, which was not effective for relief of symptoms  He has used Flonase sporadically  Recommend continuing to use Flonase nasal spray 2 sprays each nostril once daily consistently on a daily basis  Also add an over-the-counter antihistamine such as Claritin, Zyrtec, or Allegra daily  Reviewed eustachian tube dysfunction often takes several weeks to clear  Recommend trial of these medications for 2-4 weeks, if symptoms are not improving or if symptoms should worsen, advised to follow-up with ear, nose, and throat specialist, contact information provided today  Chief Complaint     Chief Complaint   Patient presents with    sinues infection     x 2-3 week        History of Present Illness     Martinez Gutierrez is a 57year old male presenting today for left ear feeling clogged  Feels like there is a "shell" over his left ear  Ongoing for 3 weeks now  Was seen in urgent care on , and placed on prednisone, did not help  Right ear intermittently feels this way  No nasal congestion  No post nasal drip  No sore throat  No cough  No sneezing  No fever  Mild seasonal allergies, noted especially with sinus pressure in the evening  Sudafed caused racing heart rate in the past          Review of Systems   Review of Systems   Constitutional: Negative for chills, diaphoresis, fatigue and fever     HENT: Positive for sinus pressure  Negative for congestion, ear discharge, ear pain, postnasal drip, rhinorrhea, sneezing, sore throat and tinnitus  Sinus pain: in the evenings  Left ear as noted in HPI   Respiratory: Negative for cough and chest tightness  Cardiovascular: Negative  Neurological: Negative for headaches  Active Problem List     Patient Active Problem List   Diagnosis    Disc degeneration, lumbar    Fatty liver    Hypertension    Impaired fasting glucose       Past Medical History:  History reviewed  No pertinent past medical history      Past Surgical History:  Past Surgical History:   Procedure Laterality Date    HERNIA REPAIR      Last Assessed:  12/29/16       Family History:  Family History   Problem Relation Age of Onset    Breast cancer Mother     Other Mother         Laryngeal cancer    Colon cancer Family        Social History:  Social History     Socioeconomic History    Marital status: /Civil Union     Spouse name: Not on file    Number of children: Not on file    Years of education: Not on file    Highest education level: Not on file   Occupational History    Not on file   Social Needs    Financial resource strain: Not on file    Food insecurity     Worry: Not on file     Inability: Not on file   [employer] needs     Medical: Not on file     Non-medical: Not on file   Tobacco Use    Smoking status: Never Smoker    Smokeless tobacco: Never Used   Substance and Sexual Activity    Alcohol use: Yes     Comment: social    Drug use: No    Sexual activity: Not on file   Lifestyle    Physical activity     Days per week: Not on file     Minutes per session: Not on file    Stress: Not on file   Relationships    Social connections     Talks on phone: Not on file     Gets together: Not on file     Attends Restorationism service: Not on file     Active member of club or organization: Not on file     Attends meetings of clubs or organizations: Not on file     Relationship status: Not on file    Intimate partner violence     Fear of current or ex partner: Not on file     Emotionally abused: Not on file     Physically abused: Not on file     Forced sexual activity: Not on file   Other Topics Concern    Not on file   Social History Narrative    Not on file       I have reviewed the patient's medical history in detail; there are no changes to the history as noted in the electronic medical record  Objective     Vitals:    05/28/21 0721 05/28/21 0750   BP: 140/90 140/80   Pulse: 75    Resp: 18    Temp: 97 8 °F (36 6 °C)    SpO2: 98%    Weight: 87 8 kg (193 lb 8 oz)    Height: 5' 6" (1 676 m)      Wt Readings from Last 3 Encounters:   05/28/21 87 8 kg (193 lb 8 oz)   05/17/21 86 2 kg (190 lb)   03/22/21 87 5 kg (192 lb 12 8 oz)     Physical Exam  Vitals signs and nursing note reviewed  Constitutional:       General: He is not in acute distress  Appearance: Normal appearance  He is not ill-appearing, toxic-appearing or diaphoretic  HENT:      Head: Normocephalic and atraumatic  Right Ear: Tympanic membrane normal       Left Ear: Tympanic membrane normal       Nose:      Right Turbinates: Swollen and pale  Left Turbinates: Swollen and pale  Comments: Right turbinates more swollen than left  Clear nasal drainage  Mouth/Throat:      Mouth: Mucous membranes are moist       Comments: Clear post nasal drip  Neck:      Musculoskeletal: Normal range of motion and neck supple  Cardiovascular:      Rate and Rhythm: Normal rate and regular rhythm  Heart sounds: No murmur  Pulmonary:      Effort: Pulmonary effort is normal  No respiratory distress  Breath sounds: Normal breath sounds  Lymphadenopathy:      Cervical: No cervical adenopathy  Neurological:      Mental Status: He is alert     Psychiatric:         Mood and Affect: Mood normal             ALLERGIES:  No Known Allergies    Current Medications     Current Outpatient Medications   Medication Sig Dispense Refill    fluticasone (FLONASE) 50 mcg/act nasal spray 1 spray into each nostril daily 9 9 mL 0    meloxicam (MOBIC) 15 mg tablet Take 1 tablet once a day after food as needed for groin pain/joint pain 30 tablet 1     No current facility-administered medications for this visit  Health Maintenance     Health Maintenance   Topic Date Due    Hepatitis C Screening  Never done    COVID-19 Vaccine (1) Never done    HIV Screening  Never done    DTaP,Tdap,and Td Vaccines (1 - Tdap) Never done    Influenza Vaccine (Season Ended) 09/01/2021    Depression Screening PHQ  03/22/2022    BMI: Followup Plan  03/22/2022    Annual Physical  03/22/2022    BMI: Adult  05/28/2022    Colonoscopy Surveillance  04/20/2026    Colorectal Cancer Screening  04/20/2031    Pneumococcal Vaccine: Pediatrics (0 to 5 Years) and At-Risk Patients (6 to 59 Years)  Aged Out    HIB Vaccine  Aged Out    Hepatitis B Vaccine  Aged Out    IPV Vaccine  Aged Out    Hepatitis A Vaccine  Aged Out    Meningococcal ACWY Vaccine  Aged Out    HPV Vaccine  Aged Out     There is no immunization history for the selected administration types on file for this patient      Jenifer Fulton

## 2021-06-01 ENCOUNTER — HOSPITAL ENCOUNTER (OUTPATIENT)
Dept: RADIOLOGY | Facility: HOSPITAL | Age: 61
Discharge: HOME | End: 2021-06-01
Attending: PREVENTIVE MEDICINE | Admitting: RADIOLOGY
Payer: OTHER MISCELLANEOUS

## 2021-06-01 ENCOUNTER — HOSPITAL ENCOUNTER (OUTPATIENT)
Dept: MRI IMAGING | Facility: HOSPITAL | Age: 61
Discharge: HOME | End: 2021-06-01
Attending: PREVENTIVE MEDICINE
Payer: OTHER MISCELLANEOUS

## 2021-06-01 DIAGNOSIS — M75.41 IMPINGEMENT SYNDROME OF RIGHT SHOULDER: ICD-10-CM

## 2021-06-01 PROCEDURE — 23350 INJECTION FOR SHOULDER X-RAY: CPT

## 2021-06-01 PROCEDURE — G1004 CDSM NDSC: HCPCS

## 2021-06-01 PROCEDURE — 77002 NEEDLE LOCALIZATION BY XRAY: CPT

## 2021-06-01 PROCEDURE — 73222 MRI JOINT UPR EXTREM W/DYE: CPT

## 2021-06-01 PROCEDURE — A9585 GADOBUTROL INJECTION: HCPCS | Performed by: PREVENTIVE MEDICINE

## 2021-06-01 RX ORDER — LIDOCAINE HYDROCHLORIDE 10 MG/ML
30 INJECTION, SOLUTION EPIDURAL; INFILTRATION; INTRACAUDAL; PERINEURAL
Status: COMPLETED | OUTPATIENT
Start: 2021-06-01 | End: 2021-06-01

## 2021-06-01 RX ADMIN — LIDOCAINE HYDROCHLORIDE 3 ML: 10 INJECTION, SOLUTION EPIDURAL; INFILTRATION; INTRACAUDAL; PERINEURAL at 12:51

## 2021-06-01 RX ADMIN — GADOBUTROL 0.2 ML: 604.72 INJECTION INTRAVENOUS at 12:51

## 2021-06-01 RX ADMIN — IOHEXOL 3 ML: 300 INJECTION, SOLUTION INTRAVENOUS at 12:50

## 2021-06-29 ENCOUNTER — TELEPHONE (OUTPATIENT)
Dept: FAMILY MEDICINE CLINIC | Facility: CLINIC | Age: 61
End: 2021-06-29

## 2021-06-29 ENCOUNTER — APPOINTMENT (OUTPATIENT)
Dept: LAB | Facility: CLINIC | Age: 61
End: 2021-06-29
Payer: COMMERCIAL

## 2021-06-29 DIAGNOSIS — R73.01 IMPAIRED FASTING GLUCOSE: ICD-10-CM

## 2021-06-29 DIAGNOSIS — E66.9 DIABETES MELLITUS TYPE 2 IN OBESE (HCC): Primary | ICD-10-CM

## 2021-06-29 DIAGNOSIS — E11.69 DIABETES MELLITUS TYPE 2 IN OBESE (HCC): Primary | ICD-10-CM

## 2021-06-29 LAB
ANION GAP SERPL CALCULATED.3IONS-SCNC: 3 MMOL/L (ref 4–13)
BUN SERPL-MCNC: 18 MG/DL (ref 5–25)
CALCIUM SERPL-MCNC: 8.9 MG/DL (ref 8.3–10.1)
CHLORIDE SERPL-SCNC: 107 MMOL/L (ref 100–108)
CO2 SERPL-SCNC: 28 MMOL/L (ref 21–32)
CREAT SERPL-MCNC: 0.75 MG/DL (ref 0.6–1.3)
CREAT UR-MCNC: 151 MG/DL
EST. AVERAGE GLUCOSE BLD GHB EST-MCNC: 171 MG/DL
GFR SERPL CREATININE-BSD FRML MDRD: 98 ML/MIN/1.73SQ M
GLUCOSE P FAST SERPL-MCNC: 156 MG/DL (ref 65–99)
HBA1C MFR BLD: 7.6 %
MICROALBUMIN UR-MCNC: 37.9 MG/L (ref 0–20)
MICROALBUMIN/CREAT 24H UR: 25 MG/G CREATININE (ref 0–30)
POTASSIUM SERPL-SCNC: 4.2 MMOL/L (ref 3.5–5.3)
SODIUM SERPL-SCNC: 138 MMOL/L (ref 136–145)

## 2021-06-29 PROCEDURE — 82570 ASSAY OF URINE CREATININE: CPT

## 2021-06-29 PROCEDURE — 3051F HG A1C>EQUAL 7.0%<8.0%: CPT | Performed by: NURSE PRACTITIONER

## 2021-06-29 PROCEDURE — 83036 HEMOGLOBIN GLYCOSYLATED A1C: CPT

## 2021-06-29 PROCEDURE — 36415 COLL VENOUS BLD VENIPUNCTURE: CPT

## 2021-06-29 PROCEDURE — 82043 UR ALBUMIN QUANTITATIVE: CPT

## 2021-06-29 PROCEDURE — 80048 BASIC METABOLIC PNL TOTAL CA: CPT

## 2021-06-29 PROCEDURE — 3061F NEG MICROALBUMINURIA REV: CPT | Performed by: NURSE PRACTITIONER

## 2021-06-29 RX ORDER — METFORMIN HYDROCHLORIDE 500 MG/1
TABLET, EXTENDED RELEASE ORAL
Qty: 60 TABLET | Refills: 3 | Status: SHIPPED | OUTPATIENT
Start: 2021-06-29

## 2021-06-29 NOTE — TELEPHONE ENCOUNTER
Called patient, no answer       Advised patient to call office to receive results note and to schedule appointment     Did not route to clerical staff to schedule due to extent of message

## 2021-06-29 NOTE — TELEPHONE ENCOUNTER
Please contact patient  I reviewed blood work  ·   Hemoglobin A1c, test for diabetes is elevated, it is currently 7 6, previous value 3 months ago was 7 2, normal value to be below 6 5  · Elevated protein in the urine, due to abnormal blood sugars  · Fasting blood sugar was 156, normal should be below 125     please advise patient to schedule follow-up appointment  I recommend to start medication to improve blood sugars for now, metformin  He will take 1 tablet daily for 7 days and then will increase dose to 2 tablets daily  I will send prescription to the pharmacy      Will discuss further at [address]    Thanks

## 2021-07-01 NOTE — TELEPHONE ENCOUNTER
3rd Call  Called pt- N/A- LM for pt to call back  Left detailed message for pt with results and provider's instructions  Task printed and mailed to pt's home

## 2021-07-19 ENCOUNTER — RA CDI HCC (OUTPATIENT)
Dept: OTHER | Facility: HOSPITAL | Age: 61
End: 2021-07-19

## 2021-07-19 NOTE — PROGRESS NOTES
[place] [phone]  coding opportunities             Chart reviewed, (number of) suggestions sent to provider: 1     Problem listed updated  Provider Accepted, (number of) suggestions accepted: 1         Number of suggestions actually used: 1      Number of suggestions NOT actually used: 0     Patients insurance company: [employer] (Medicare Advantage and Commercial)     Visit status: Patient arrived for their scheduled appointment        [place] [address]  coding opportunities             Chart reviewed, (number of) suggestions sent to provider: 1     Problem listed updated   Provider Accepted, (number of) suggestions accepted: 1               Patients insurance company: [employer] (Medicare Advantage and Commercial)           [place] [address]  coding opportunities             Chart reviewed, (number of) suggestions sent to provider: 1                  Patients insurance company: [employer] [address] [place] [address] (Medicare Advantage and Commercial)           On active problem list - please assess using MEAT for 2021 billing  E11 64 Diabetes mellitus type 2 in obese ([place] [address] )

## 2021-07-23 ENCOUNTER — APPOINTMENT (OUTPATIENT)
Dept: LAB | Facility: CLINIC | Age: 61
End: 2021-07-23
Payer: COMMERCIAL

## 2021-07-23 ENCOUNTER — TRANSCRIBE ORDERS (OUTPATIENT)
Dept: LAB | Facility: CLINIC | Age: 61
End: 2021-07-23
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
## 2021-07-23 DIAGNOSIS — H91.20 SUDDEN-ONSET SENSORINEURAL HEARING LOSS: ICD-10-CM

## 2021-07-23 DIAGNOSIS — H91.20 SUDDEN-ONSET SENSORINEURAL HEARING LOSS: Primary | ICD-10-CM

## 2021-07-23 PROCEDURE — 36415 COLL VENOUS BLD VENIPUNCTURE: CPT

## 2021-07-23 PROCEDURE — 86618 LYME DISEASE ANTIBODY: CPT

## 2021-07-24 LAB — B BURGDOR IGG+IGM SER-ACNC: 50

## 2021-07-26 ENCOUNTER — OFFICE VISIT (OUTPATIENT)
Dept: FAMILY MEDICINE CLINIC | Facility: CLINIC | Age: 61
End: 2021-07-26
Payer: COMMERCIAL

## 2021-07-26 VITALS
RESPIRATION RATE: 18 BRPM | SYSTOLIC BLOOD PRESSURE: 142 MMHG | HEART RATE: 78 BPM | DIASTOLIC BLOOD PRESSURE: 80 MMHG | WEIGHT: 184.25 LBS | HEIGHT: 66 IN | BODY MASS INDEX: 29.61 KG/M2 | OXYGEN SATURATION: 97 % | TEMPERATURE: 97.5 F

## 2021-07-26 DIAGNOSIS — S46.011S TRAUMATIC TEAR OF RIGHT ROTATOR CUFF, UNSPECIFIED TEAR EXTENT, SEQUELA: ICD-10-CM

## 2021-07-26 DIAGNOSIS — I10 ESSENTIAL HYPERTENSION: ICD-10-CM

## 2021-07-26 DIAGNOSIS — E11.69 DIABETES MELLITUS TYPE 2 IN OBESE (HCC): Primary | ICD-10-CM

## 2021-07-26 DIAGNOSIS — E66.9 DIABETES MELLITUS TYPE 2 IN OBESE (HCC): Primary | ICD-10-CM

## 2021-07-26 DIAGNOSIS — H93.12 TINNITUS OF LEFT EAR: ICD-10-CM

## 2021-07-26 DIAGNOSIS — E11.21 DIABETIC NEPHROPATHY ASSOCIATED WITH TYPE 2 DIABETES MELLITUS (HCC): ICD-10-CM

## 2021-07-26 PROBLEM — S46.011A TRAUMATIC TEAR OF RIGHT ROTATOR CUFF: Status: ACTIVE | Noted: 2021-07-26

## 2021-07-26 PROBLEM — M75.101 TEAR OF RIGHT ROTATOR CUFF: Status: ACTIVE | Noted: 2021-07-26

## 2021-07-26 PROCEDURE — 1036F TOBACCO NON-USER: CPT | Performed by: FAMILY MEDICINE

## 2021-07-26 PROCEDURE — 3066F NEPHROPATHY DOC TX: CPT | Performed by: FAMILY MEDICINE

## 2021-07-26 PROCEDURE — 3079F DIAST BP 80-89 MM HG: CPT | Performed by: FAMILY MEDICINE

## 2021-07-26 PROCEDURE — 3077F SYST BP >= 140 MM HG: CPT | Performed by: FAMILY MEDICINE

## 2021-07-26 PROCEDURE — 99214 OFFICE O/P EST MOD 30 MIN: CPT | Performed by: FAMILY MEDICINE

## 2021-07-26 PROCEDURE — 3008F BODY MASS INDEX DOCD: CPT | Performed by: FAMILY MEDICINE

## 2021-07-26 NOTE — ASSESSMENT & PLAN NOTE
Patient reports improvement of chronic right shoulder pain with current steroid therapy    He will continue close follow-up with Orthopedic surgery

## 2021-07-26 NOTE — PROGRESS NOTES
FAMILY PRACTICE OFFICE VISIT       NAME: Amanda Rausch  AGE: 57 y o  SEX: male       : 1960        MRN: [id number]        Assessment and Plan     1  Diabetes mellitus type 2 in obese [facility])  Assessment & Plan:    Lab Results   Component Value Date    HGBA1C 7 6 (H) 2021   Patient will start metformin now  Repeat blood work including hemoglobin A1c, CMP and urine microalbumin in 3 months  Hold off ACE/ARB for now as urine microalbumin may normalize with improved glycemic control  We discussed importance of exercise, weight loss and low-carbohydrate diet  Patient will schedule diabetic eye exam    Orders:  -     Comprehensive metabolic panel; Future; Expected date: 10/29/2021  -     Hemoglobin A1C; Future; Expected date: 10/29/2021    2  Diabetic nephropathy associated with type 2 diabetes mellitus ([facility] [address] )  Assessment & Plan:  Patient reports improvement of chronic right shoulder pain with current steroid therapy  He will continue close follow-up with Orthopedic surgery    Orders:  -     Microalbumin / creatinine urine ratio; Future; Expected date: 10/29/2021    3  Traumatic tear of right rotator cuff, unspecified tear extent, sequela  Assessment & Plan:  Patient reports improvement of chronic right shoulder pain with current steroid therapy  He will continue close follow-up with Orthopedic surgery    Orders:  -     Ambulatory referral to Orthopedic Surgery; Future    4  Essential hypertension  Assessment & Plan:   Blood pressure is on the high side of normal in the office today  Patient reports white coat syndrome  Patient's wife has been monitoring blood pressures at home and they are reportedly within normal limits  I advised patient to contact me if BP is consistently above 130/80      5  Tinnitus of left ear  Assessment & Plan:   Persistent left ear tinnitus, pressure and hearing loss within past 2 months  Patient is under care of ENT, Dr Aundria Osgood      Asymmetric hearing loss as per recent audiogram     Pending MRI brain August 6  Patient is complaining course of prednisone and doxycycline             There are no Patient Instructions on file for this visit  No follow-ups on file  Discussed with the patient and all questioned fully answered  He will call me if any problems arise  M*Modal software was used to dictate this note  It may contain errors with dictating incorrect words/spelling  Please contact provider directly with any questions  Chief Complaint     Chief Complaint   Patient presents with    Follow-up     b/w     foot exam     sock and shoes remove        History of Present Illness     Patient presents for follow-up  Recent diagnosis of type 2 diabetes  It has been diet controlled so far  Patient has not started metformin that I have prescribed  He is concerned about interaction of metformin with current antibiotic therapy  Patient has been making his best effort to stay active and follow healthy diabetic diet  He is due for diabetic eye exam   Recent hemoglobin A1c 7 6  He has been following up with ENT regarding recurrent left ear pain  He was originally seen by urgent care center and then subsequently evaluated by nurse practitioner in our office  Left ear feels pressured and clogged  Patient reports recurrent symptoms of tinnitus described as Ocean sounds  He just completed course of prednisone and is still on antibiotic (doxycycline ) that was prescribed for 10 days  Patient has not noticed any improvement in his symptoms so far  Patient had audiology evaluation it ENT office which has revealed asymmetric hearing loss  Patient is currently scheduled for MRI brain with IAC as per Dr Aundria Osgood  Patient reports chronic symptoms of sinus congestion and postnasal drip  Patient has been experiencing rather persistent right shoulder pain, he has been following up with Orthopedic team regarding rotator cuff tear    Patient reports that shoulder pain has significantly improved while on prednisone therapy  He might be considering surgery in the future  Review of Systems   Review of Systems   Constitutional: Negative  HENT: Positive for congestion, sinus pressure and tinnitus  Left ear feels clogged   Eyes: Negative  Respiratory: Negative  Cardiovascular: Negative  Gastrointestinal: Negative  Endocrine: Negative  Genitourinary: Negative  Musculoskeletal: Positive for arthralgias ( right shoulder)  Skin: Negative  Allergic/Immunologic: Negative  Neurological: Negative  Hematological: Negative  Psychiatric/Behavioral: Negative          Active Problem List     Patient Active Problem List   Diagnosis    Disc degeneration, lumbar    Fatty liver    Hypertension    Diabetes mellitus type 2 in obese ([address] )    Traumatic tear of right rotator cuff    Tinnitus of left ear       Past Medical History:  Past Medical History:   Diagnosis Date    Environmental allergies        Past Surgical History:  Past Surgical History:   Procedure Laterality Date    FL INJECTION RIGHT SHOULDER (ARTHROGRAM)  6/1/2021    HERNIA REPAIR      Last Assessed:  12/29/16       Family History:  Family History   Problem Relation Age of Onset    Breast cancer Mother     Other Mother         Laryngeal cancer    Colon cancer Family        Social History:  Social History     Socioeconomic History    Marital status: /Civil Union     Spouse name: Not on file    Number of children: Not on file    Years of education: Not on file    Highest education level: Not on file   Occupational History    Not on file   Tobacco Use    Smoking status: Never Smoker    Smokeless tobacco: Never Used   Vaping Use    Vaping Use: Never used   Substance and Sexual Activity    Alcohol use: Yes     Comment: social    Drug use: No    Sexual activity: Not on file   Other Topics Concern    Not on file   Social History Narrative    Not on file     Social Determinants of Health     Financial Resource Strain:     Difficulty of Paying Living Expenses:    Food Insecurity:     Worried About Running Out of Food in the Last Year:     [address] in the Last Year:    Transportation Needs:     Lack of Transportation (Medical):  Lack of Transportation (Non-Medical):    Physical Activity:     Days of Exercise per Week:     Minutes of Exercise per Session:    Stress:     Feeling of Stress :    Social Connections:     Frequency of Communication with Friends and Family:     Frequency of Social Gatherings with Friends and Family:     Attends [employer] Services:     Active Member of Clubs or Organizations:     Attends Club or Organization Meetings:     Marital Status:    Intimate Partner Violence:     Fear of Current or Ex-Partner:     Emotionally Abused:     Physically Abused:     Sexually Abused:          Objective     Vitals:    07/26/21 0903 07/26/21 0939   BP: 150/80 142/80   Pulse: 78    Resp: 18    Temp: 97 5 °F (36 4 °C)    SpO2: 97%    Weight: 83 6 kg (184 lb 4 oz)    Height: 5' 6" (1 676 m)        Wt Readings from Last 3 Encounters:   07/26/21 83 kg (183 lb)   07/26/21 83 6 kg (184 lb 4 oz)   07/19/21 86 2 kg (190 lb)       Physical Exam  Vitals and nursing note reviewed  Constitutional:       General: He is not in acute distress  Appearance: Normal appearance  He is well-developed  He is not ill-appearing  HENT:      Head: Normocephalic and atraumatic  Right Ear: Tympanic membrane and ear canal normal       Left Ear: Tympanic membrane and ear canal normal       Mouth/Throat:      Pharynx: Oropharynx is clear  Comments: Postnasal drip  Eyes:      General: No scleral icterus  Conjunctiva/sclera: Conjunctivae normal    Neck:      Vascular: No carotid bruit  Cardiovascular:      Rate and Rhythm: Normal rate and regular rhythm        Pulses: no weak pulses          Dorsalis pedis pulses are 2+ on the right side and 2+ on the left side  Heart sounds: Normal heart sounds  No murmur heard  Pulmonary:      Effort: Pulmonary effort is normal  No respiratory distress  Breath sounds: Normal breath sounds  No wheezing or rales  Abdominal:      General: There is no distension or abdominal bruit  Musculoskeletal:         General: Normal range of motion  Cervical back: Neck supple  No rigidity  Right lower leg: No edema  Left lower leg: No edema  Feet:      Right foot:      Skin integrity: No ulcer, skin breakdown, erythema, warmth, callus or dry skin  Left foot:      Skin integrity: No ulcer, skin breakdown, erythema, warmth, callus or dry skin  Skin:     General: Skin is warm  Neurological:      General: No focal deficit present  Mental Status: He is alert and oriented to person, place, and time  Cranial Nerves: No cranial nerve deficit  Psychiatric:         Mood and Affect: Mood normal          Behavior: Behavior normal          Thought Content: Thought content normal         Patient's shoes and socks removed  Right Foot/Ankle   Right Foot Inspection  Skin Exam: skin normal and skin intact no dry skin, no warmth, no callus, no erythema, no maceration, no abnormal color, no pre-ulcer, no ulcer and no callus                          Toe Exam: ROM and strength within normal limits  Sensory   Vibration: intact  Proprioception: intact   Monofilament testing: intact  Vascular    The right DP pulse is 2+  Left Foot/Ankle  Left Foot Inspection  Skin Exam: skin normal and skin intactno dry skin, no warmth, no erythema, no maceration, normal color, no pre-ulcer, no ulcer and no callus                         Toe Exam: ROM and strength within normal limits                   Sensory   Vibration: intact  Proprioception: intact  Monofilament: intact  Vascular    The left DP pulse is 2+  Assign Risk Category:  No deformity present; No loss of protective sensation;  No weak pulses       Risk: 0      Pertinent Laboratory/Diagnostic Studies:    Lab Results   Component Value Date    WBC 7 39 03/29/2021    HGB 16 0 03/29/2021    HCT 46 8 03/29/2021    MCV 95 03/29/2021     03/29/2021       No results found for: TSH    Lab Results   Component Value Date    CHOL 209 (H) 01/11/2017     Lab Results   Component Value Date    TRIG 65 03/29/2021     Lab Results   Component Value Date    HDL 62 03/29/2021     Lab Results   Component Value Date    LDLCALC 97 03/29/2021     Lab Results   Component Value Date    HGBA1C 7 6 (H) 06/29/2021     Lab Results   Component Value Date    SODIUM 138 06/29/2021    K 4 2 06/29/2021     06/29/2021    CO2 28 06/29/2021    AGAP 3 (L) 06/29/2021    BUN 18 06/29/2021    CREATININE 0 75 06/29/2021    GLUC 129 06/19/2019    GLUF 156 (H) 06/29/2021    CALCIUM 8 9 06/29/2021    AST 28 03/29/2021    ALT 52 03/29/2021    ALKPHOS 94 03/29/2021    PROT 7 1 01/11/2017    TP 7 5 03/29/2021    BILITOT 0 6 01/11/2017    TBILI 0 80 03/29/2021    EGFR 98 06/29/2021       Orders Placed This Encounter   Procedures    Comprehensive metabolic panel    Hemoglobin A1C    Microalbumin / creatinine urine ratio    Ambulatory referral to Orthopedic Surgery       ALLERGIES:  No Known Allergies    Current Medications     Current Outpatient Medications   Medication Sig Dispense Refill    doxycycline (ADOXA) 100 MG tablet Take 1 tablet (100 mg total) by mouth 2 (two) times a day for 10 days 20 tablet 0    meloxicam (MOBIC) 15 mg tablet Take 1 tablet once a day after food as needed for groin pain/joint pain 30 tablet 1    metFORMIN (GLUCOPHAGE-XR) 500 mg 24 hr tablet Take 1 tablet once a day with dinner for 7 days, then increase to  2 tablets once a day at dinner time 60 tablet 3    fluticasone (FLONASE) 50 mcg/act nasal spray 1 spray into each nostril daily (Patient not taking: Reported on 7/26/2021) 9 9 mL 0    predniSONE 20 mg tablet Take 3 (three) TABS daily for 2 weeks (Patient not taking: Reported on 7/26/2021) 42 tablet 0     No current facility-administered medications for this visit  There are no discontinued medications  Health Maintenance     Health Maintenance   Topic Date Due    Hepatitis C Screening  Never done    Pneumococcal Vaccine: Pediatrics (0 to 5 Years) and At-Risk Patients (6 to 59 Years) (1 of 2 - PPSV23) Never done    DM Eye Exam  Never done    HIV Screening  Never done    COVID-19 Vaccine (1) Never done    DTaP,Tdap,and Td Vaccines (1 - Tdap) Never done    Influenza Vaccine (1) 09/01/2021    HEMOGLOBIN A1C  12/29/2021    Depression Screening PHQ  03/22/2022    BMI: Followup Plan  03/22/2022    Annual Physical  03/22/2022    URINE MICROALBUMIN  06/29/2022    BMI: Adult  07/26/2022    Diabetic Foot Exam  07/26/2022    Colorectal Cancer Screening  04/20/2026    HIB Vaccine  Aged Out    Hepatitis B Vaccine  Aged Out    IPV Vaccine  Aged Out    Hepatitis A Vaccine  Aged Out    Meningococcal ACWY Vaccine  Aged Out    HPV Vaccine  Aged Out       There is no immunization history for the selected administration types on file for this patient      Vona Harada, MD

## 2021-07-26 NOTE — ASSESSMENT & PLAN NOTE
Lab Results   Component Value Date    HGBA1C 7 6 (H) 06/29/2021   Patient will start metformin now  Repeat blood work including hemoglobin A1c, CMP and urine microalbumin in 3 months  Hold off ACE/ARB for now as urine microalbumin may normalize with improved glycemic control  We discussed importance of exercise, weight loss and low-carbohydrate diet    Patient will schedule diabetic eye exam

## 2021-07-28 PROBLEM — E66.9 DIABETES MELLITUS TYPE 2 IN OBESE (HCC): Chronic | Status: ACTIVE | Noted: 2017-01-12

## 2021-07-28 PROBLEM — H93.12 TINNITUS OF LEFT EAR: Status: ACTIVE | Noted: 2021-07-28

## 2021-07-28 PROBLEM — E11.69 DIABETES MELLITUS TYPE 2 IN OBESE (HCC): Chronic | Status: ACTIVE | Noted: 2017-01-12

## 2021-07-28 NOTE — ASSESSMENT & PLAN NOTE
Persistent left ear tinnitus, pressure and hearing loss within past 2 months  Patient is under care of ENT, Dr Joseph Lazcano  Asymmetric hearing loss as per recent audiogram     Pending MRI brain August 6    Patient is complaining course of prednisone and doxycycline

## 2021-07-28 NOTE — ASSESSMENT & PLAN NOTE
Blood pressure is on the high side of normal in the office today  Patient reports white coat syndrome  Patient's wife has been monitoring blood pressures at home and they are reportedly within normal limits    I advised patient to contact me if BP is consistently above 130/80

## 2021-08-06 ENCOUNTER — HOSPITAL ENCOUNTER (OUTPATIENT)
Dept: MRI IMAGING | Facility: HOSPITAL | Age: 61
Discharge: HOME | End: 2021-08-06
Payer: COMMERCIAL

## 2021-08-06 DIAGNOSIS — H91.20 SUDDEN-ONSET SENSORINEURAL HEARING LOSS: ICD-10-CM

## 2021-08-06 PROCEDURE — G1004 CDSM NDSC: HCPCS

## 2021-08-06 PROCEDURE — 70553 MRI BRAIN STEM W/O & W/DYE: CPT

## 2021-08-06 PROCEDURE — A9585 GADOBUTROL INJECTION: HCPCS | Performed by: PHYSICIAN ASSISTANT

## 2021-08-06 RX ADMIN — GADOBUTROL 8 ML: 604.72 INJECTION INTRAVENOUS at 16:22

## 2021-08-17 DIAGNOSIS — H93.12 TINNITUS OF LEFT EAR: Primary | ICD-10-CM

## 2021-09-02 ENCOUNTER — OFFICE VISIT (OUTPATIENT)
Dept: FAMILY MEDICINE CLINIC | Facility: CLINIC | Age: 61
End: 2021-09-02
Payer: COMMERCIAL

## 2021-09-02 VITALS
DIASTOLIC BLOOD PRESSURE: 70 MMHG | HEART RATE: 77 BPM | WEIGHT: 190 LBS | SYSTOLIC BLOOD PRESSURE: 132 MMHG | HEIGHT: 66 IN | TEMPERATURE: 97.8 F | BODY MASS INDEX: 30.53 KG/M2 | OXYGEN SATURATION: 98 % | RESPIRATION RATE: 16 BRPM

## 2021-09-02 DIAGNOSIS — H93.12 TINNITUS OF LEFT EAR: ICD-10-CM

## 2021-09-02 DIAGNOSIS — J32.4 CHRONIC PANSINUSITIS: Primary | ICD-10-CM

## 2021-09-02 DIAGNOSIS — Z23 ENCOUNTER FOR IMMUNIZATION: ICD-10-CM

## 2021-09-02 DIAGNOSIS — J34.2 DEVIATED NASAL SEPTUM: ICD-10-CM

## 2021-09-02 PROCEDURE — 99213 OFFICE O/P EST LOW 20 MIN: CPT | Performed by: FAMILY MEDICINE

## 2021-09-02 PROCEDURE — 3008F BODY MASS INDEX DOCD: CPT | Performed by: FAMILY MEDICINE

## 2021-09-02 PROCEDURE — 90471 IMMUNIZATION ADMIN: CPT

## 2021-09-02 PROCEDURE — 90715 TDAP VACCINE 7 YRS/> IM: CPT

## 2021-09-02 RX ORDER — PREDNISONE 10 MG/1
TABLET ORAL
Qty: 20 TABLET | Refills: 0 | Status: SHIPPED | OUTPATIENT
Start: 2021-09-02 | End: 2022-02-17 | Stop reason: ALTCHOICE

## 2021-09-02 RX ORDER — AMOXICILLIN AND CLAVULANATE POTASSIUM 875; 125 MG/1; MG/1
1 TABLET, FILM COATED ORAL
Qty: 20 TABLET | Refills: 0 | Status: SHIPPED | OUTPATIENT
Start: 2021-09-02 | End: 2021-09-12

## 2021-09-02 NOTE — PROGRESS NOTES
FAMILY PRACTICE OFFICE VISIT       NAME: Maxine Renee  AGE: 57 y o  SEX: male       : 1960        MRN: [id number]        Assessment and Plan     1  Chronic pansinusitis  -     CT sinus wo contrast; Future; Expected date: 2021  -     amoxicillin-clavulanate (AUGMENTIN) 875-125 mg per tablet; Take 1 tablet by mouth 2 (two) times daily after meals for 10 days  -     predniSONE 10 mg tablet; Take 40 mg (4 tabs) daily x 2 days; then 30 mg (3 tabs) daily x 2 days then 20 mg (2 tabs) daily x 2 days then 10 mg ( 1 tab) dailyx 2 days    2  Tinnitus of left ear    3  Deviated nasal septum  -     CT sinus wo contrast; Future; Expected date: 2021    4  Encounter for immunization  -     TDAP VACCINE GREATER THAN OR EQUAL TO 6YO IM    Patient presents for evaluation of recurrent sinusitis,ongoing sinus pressure, decreased hearing and left-sided tinnitus  Recent MRI of brain with attention to IAC's was negative  Patient is scheduled for ENT re-evaluation in mid September  I advised him to start regimen of Augmentin and prednisone  We will proceed with CT sinuses  Follow up pending diagnostic results, updates and after ENT consult  There are no Patient Instructions on file for this visit  No follow-ups on file  Discussed with the patient and all questioned fully answered  He will call me if any problems arise  M*Modal software was used to dictate this note  It may contain errors with dictating incorrect words/spelling  Please contact provider directly with any questions  Chief Complaint     Chief Complaint   Patient presents with    Follow-up     MRI & ENT       History of Present Illness     Patient presents for evaluation of ongoing left ear discomfort and persistent tinnitus  He is complaining of left retro-orbital, frontal and ethmoid area pressure  Left eye occasionally looks crusty in the morning  Patient describes tinnitus as "constant airplane noise" in left ear  Patient reports interim dizziness/lightheadedness  He feels off balance often  No spinning sensation  Patient denies symptoms of cough or fever  He admits to constant postnasal drip  He was recently evaluated for symptoms of decreased hearing by [facility]  MRI brain  with IAC  brain performed on August 6, 2021 was essentially normal   Audiogram revealed ongoing Left sensorineural hearing loss  Patient was treated with antibiotics, Flonase and corticosteroids by ENT and primary care team, no results  Review of Systems   Review of Systems   Constitutional: Negative  HENT: Positive for hearing loss, sinus pain and tinnitus  Eyes: Negative  Respiratory: Negative  Cardiovascular: Negative  Gastrointestinal: Negative  Neurological: Negative  Psychiatric/Behavioral: Negative          Active Problem List     Patient Active Problem List   Diagnosis    Disc degeneration, lumbar    Fatty liver    Hypertension    Diabetes mellitus type 2 in obese ([address] )    Traumatic tear of right rotator cuff    Tinnitus of left ear       Past Medical History:  Past Medical History:   Diagnosis Date    Environmental allergies        Past Surgical History:  Past Surgical History:   Procedure Laterality Date    FL INJECTION RIGHT SHOULDER (ARTHROGRAM)  6/1/2021    HERNIA REPAIR      Last Assessed:  12/29/16       Family History:  Family History   Problem Relation Age of Onset    Breast cancer Mother     Other Mother         Laryngeal cancer    Colon cancer Family        Social History:  Social History     Socioeconomic History    Marital status: /Civil Union     Spouse name: Not on file    Number of children: Not on file    Years of education: Not on file    Highest education level: Not on file   Occupational History    Not on file   Tobacco Use    Smoking status: Never Smoker    Smokeless tobacco: Never Used   Vaping Use    Vaping Use: Never used   Substance and Sexual Activity    Alcohol use: Yes     Comment: social    Drug use: No    Sexual activity: Yes   Other Topics Concern    Not on file   Social History Narrative    Not on file     Social Determinants of Health     Financial Resource Strain:     Difficulty of Paying Living Expenses:    Food Insecurity:     Worried About Running Out of Food in the Last Year:     [address] in the Last Year:    Transportation Needs:     Lack of Transportation (Medical):  Lack of Transportation (Non-Medical):    Physical Activity:     Days of Exercise per Week:     Minutes of Exercise per Session:    Stress:     Feeling of Stress :    Social Connections:     Frequency of Communication with Friends and Family:     Frequency of Social Gatherings with Friends and Family:     Attends [employer] Services:     Active Member of Clubs or Organizations:     Attends Club or Organization Meetings:     Marital Status:    Intimate Partner Violence:     Fear of Current or Ex-Partner:     Emotionally Abused:     Physically Abused:     Sexually Abused:          Objective     Vitals:    09/02/21 0724   BP: 132/70   BP Location: Left arm   Patient Position: Sitting   Cuff Size: Large   Pulse: 77   Resp: 16   Temp: 97 8 °F (36 6 °C)   TempSrc: Temporal   SpO2: 98%   Weight: 86 2 kg (190 lb)   Height: 5' 6" (1 676 m)       Wt Readings from Last 3 Encounters:   09/02/21 86 2 kg (190 lb)   07/26/21 83 kg (183 lb)   07/26/21 83 6 kg (184 lb 4 oz)       Physical Exam  Constitutional:       General: He is not in acute distress  Appearance: Normal appearance  He is not ill-appearing  HENT:      Head: Normocephalic and atraumatic  Right Ear: Tympanic membrane and ear canal normal  There is no impacted cerumen  Left Ear: Tympanic membrane and ear canal normal  There is no impacted cerumen  Nose: Nasal deformity and septal deviation (to the left) present  Left Sinus: Frontal sinus tenderness present     Cardiovascular:      Rate and Rhythm: Normal rate and regular rhythm  Pulmonary:      Effort: Pulmonary effort is normal  No respiratory distress  Breath sounds: Normal breath sounds  No wheezing or rhonchi  Musculoskeletal:      Cervical back: No rigidity  Neurological:      General: No focal deficit present  Mental Status: He is alert and oriented to person, place, and time     Psychiatric:         Mood and Affect: Mood normal          Behavior: Behavior normal           Pertinent Laboratory/Diagnostic Studies:    Lab Results   Component Value Date    WBC 7 39 03/29/2021    HGB 16 0 03/29/2021    HCT 46 8 03/29/2021    MCV 95 03/29/2021     03/29/2021       No results found for: TSH    Lab Results   Component Value Date    CHOL 209 (H) 01/11/2017     Lab Results   Component Value Date    TRIG 65 03/29/2021     Lab Results   Component Value Date    HDL 62 03/29/2021     Lab Results   Component Value Date    LDLCALC 97 03/29/2021     Lab Results   Component Value Date    HGBA1C 7 6 (H) 06/29/2021     Lab Results   Component Value Date    SODIUM 138 06/29/2021    K 4 2 06/29/2021     06/29/2021    CO2 28 06/29/2021    AGAP 3 (L) 06/29/2021    BUN 18 06/29/2021    CREATININE 0 75 06/29/2021    GLUC 129 06/19/2019    GLUF 156 (H) 06/29/2021    CALCIUM 8 9 06/29/2021    AST 28 03/29/2021    ALT 52 03/29/2021    ALKPHOS 94 03/29/2021    PROT 7 1 01/11/2017    TP 7 5 03/29/2021    BILITOT 0 6 01/11/2017    TBILI 0 80 03/29/2021    EGFR 98 06/29/2021       Orders Placed This Encounter   Procedures    CT sinus wo contrast    TDAP VACCINE GREATER THAN OR EQUAL TO 6YO IM       ALLERGIES:  No Known Allergies    Current Medications     Current Outpatient Medications   Medication Sig Dispense Refill    amoxicillin-clavulanate (AUGMENTIN) 875-125 mg per tablet Take 1 tablet by mouth 2 (two) times daily after meals for 10 days 20 tablet 0    fluticasone (FLONASE) 50 mcg/act nasal spray 1 spray into each nostril daily (Patient not taking: Reported on 7/26/2021) 9 9 mL 0    meloxicam (MOBIC) 15 mg tablet Take 1 tablet once a day after food as needed for groin pain/joint pain (Patient not taking: Reported on 9/2/2021) 30 tablet 1    metFORMIN (GLUCOPHAGE-XR) 500 mg 24 hr tablet Take 1 tablet once a day with dinner for 7 days, then increase to  2 tablets once a day at dinner time (Patient not taking: Reported on 9/2/2021) 60 tablet 3    predniSONE 10 mg tablet Take 40 mg (4 tabs) daily x 2 days; then 30 mg (3 tabs) daily x 2 days then 20 mg (2 tabs) daily x 2 days then 10 mg ( 1 tab) dailyx 2 days 20 tablet 0     No current facility-administered medications for this visit         Medications Discontinued During This Encounter   Medication Reason    predniSONE 20 mg tablet        Health Maintenance     Health Maintenance   Topic Date Due    Hepatitis C Screening  Never done    Pneumococcal Vaccine: Pediatrics (0 to 5 Years) and At-Risk Patients (6 to 59 Years) (1 of 2 - PPSV23) Never done    DM Eye Exam  Never done    COVID-19 Vaccine (1) Never done    HIV Screening  Never done    Influenza Vaccine (1) 09/01/2021    HEMOGLOBIN A1C  12/29/2021    BMI: Followup Plan  03/22/2022    Annual Physical  03/22/2022    URINE MICROALBUMIN  06/29/2022    Diabetic Foot Exam  07/26/2022    Depression Screening PHQ  09/02/2022    BMI: Adult  09/02/2022    Colorectal Cancer Screening  04/20/2026    DTaP,Tdap,and Td Vaccines (2 - Td or Tdap) 09/02/2031    HIB Vaccine  Aged Out    Hepatitis B Vaccine  Aged Out    IPV Vaccine  Aged Out    Hepatitis A Vaccine  Aged Out    Meningococcal ACWY Vaccine  Aged Out    HPV Vaccine  Aged [employer] History   Administered Date(s) Administered    Tdap 09/02/2021       Fabian Alegre MD

## 2021-09-08 ENCOUNTER — HOSPITAL ENCOUNTER (OUTPATIENT)
Dept: CT IMAGING | Facility: HOSPITAL | Age: 61
Discharge: HOME | End: 2021-09-08
Payer: COMMERCIAL

## 2021-09-08 DIAGNOSIS — J34.2 DEVIATED NASAL SEPTUM: ICD-10-CM

## 2021-09-08 DIAGNOSIS — J32.4 CHRONIC PANSINUSITIS: ICD-10-CM

## 2021-09-08 PROCEDURE — 70486 CT MAXILLOFACIAL W/O DYE: CPT

## 2021-09-08 PROCEDURE — G1004 CDSM NDSC: HCPCS

## 2021-12-08 DIAGNOSIS — Z11.52 ENCOUNTER FOR SCREENING FOR COVID-19: Primary | ICD-10-CM

## 2021-12-09 PROCEDURE — U0005 INFEC AGEN DETEC AMPLI PROBE: HCPCS | Performed by: FAMILY MEDICINE

## 2021-12-09 PROCEDURE — U0003 INFECTIOUS AGENT DETECTION BY NUCLEIC ACID (DNA OR RNA); SEVERE ACUTE RESPIRATORY SYNDROME CORONAVIRUS 2 (SARS-COV-2) (CORONAVIRUS DISEASE [COVID-19]), AMPLIFIED PROBE TECHNIQUE, MAKING USE OF HIGH THROUGHPUT TECHNOLOGIES AS DESCRIBED BY CMS-2020-01-R: HCPCS | Performed by: FAMILY MEDICINE

## 2022-02-17 ENCOUNTER — OFFICE VISIT (OUTPATIENT)
Dept: FAMILY MEDICINE CLINIC | Facility: CLINIC | Age: 62
End: 2022-02-17
Payer: COMMERCIAL

## 2022-02-17 VITALS
WEIGHT: 189 LBS | SYSTOLIC BLOOD PRESSURE: 132 MMHG | BODY MASS INDEX: 30.37 KG/M2 | HEART RATE: 74 BPM | HEIGHT: 66 IN | OXYGEN SATURATION: 100 % | RESPIRATION RATE: 20 BRPM | TEMPERATURE: 97.8 F | DIASTOLIC BLOOD PRESSURE: 80 MMHG

## 2022-02-17 DIAGNOSIS — E66.9 DIABETES MELLITUS TYPE 2 IN OBESE (HCC): ICD-10-CM

## 2022-02-17 DIAGNOSIS — Z12.5 PROSTATE CANCER SCREENING: ICD-10-CM

## 2022-02-17 DIAGNOSIS — J34.2 DEVIATED NASAL SEPTUM: ICD-10-CM

## 2022-02-17 DIAGNOSIS — J32.9 RECURRENT SINUSITIS: Primary | ICD-10-CM

## 2022-02-17 DIAGNOSIS — E11.69 DIABETES MELLITUS TYPE 2 IN OBESE (HCC): ICD-10-CM

## 2022-02-17 PROCEDURE — 3079F DIAST BP 80-89 MM HG: CPT | Performed by: FAMILY MEDICINE

## 2022-02-17 PROCEDURE — 99213 OFFICE O/P EST LOW 20 MIN: CPT | Performed by: FAMILY MEDICINE

## 2022-02-17 PROCEDURE — 3075F SYST BP GE 130 - 139MM HG: CPT | Performed by: FAMILY MEDICINE

## 2022-02-17 PROCEDURE — 3008F BODY MASS INDEX DOCD: CPT | Performed by: FAMILY MEDICINE

## 2022-02-17 RX ORDER — CEFPROZIL 500 MG/1
500 TABLET, FILM COATED ORAL 2 TIMES DAILY
Qty: 20 TABLET | Refills: 0 | Status: SHIPPED | OUTPATIENT
Start: 2022-02-17 | End: 2022-02-27

## 2022-02-17 NOTE — PROGRESS NOTES
FAMILY PRACTICE OFFICE VISIT       NAME: Eliza Schmidt  AGE: 60 y o  SEX: male       : 1960        MRN: [id number]        Assessment and Plan     1  Recurrent sinusitis  -     Ambulatory Referral to Otolaryngology; Future  -     cefprosil (CEFZIL) 500 MG tablet; Take 1 tablet (500 mg total) by mouth 2 (two) times a day for 10 days    2  Deviated nasal septum  -     Ambulatory Referral to Otolaryngology; Future    3  Diabetes mellitus type 2 in obese [facility])  Assessment & Plan:    Lab Results   Component Value Date    HGBA1C 7 6 (H) 2021   No medications  Patient is managing with diet  He is due for blood work  Orders:  -     CBC and differential; Future  -     Comprehensive metabolic panel; Future  -     Lipid Panel with Direct LDL reflex; Future  -     TSH, 3rd generation; Future  -     Microalbumin / creatinine urine ratio; Future  -     Hemoglobin A1C; Future    4  Prostate cancer screening  -     PSA, Total Screen; Future  Patient presents for evaluation of acute/recurrent sinusitis  He should start using over-the-counter saline nasal rinses twice a day  I advised him to schedule follow-up with ENT to discuss results of CT sinuses revealing left-sided septum deviation  Start Cefzil 500 milligrams b i d  for 7 to 10 days  Patient will contact me if symptoms are not improving  Patient will proceed with blood work and schedule annual physical/checkup in spring  There are no Patient Instructions on file for this visit  No follow-ups on file  Discussed with the patient and all questioned fully answered  He will call me if any problems arise  M*Modal software was used to dictate this note  It may contain errors with dictating incorrect words/spelling  Please contact provider directly with any questions         Chief Complaint     Chief Complaint   Patient presents with    Sinusitis Sx     PND, nasal congestion, ST, frontal pain       History of Present Illness     PND, sore throat and nasal congestion and sinus pressure  X 2 weeks  Used OTC Rx and sprays  Coughs in am - when mucus is building up  Chronic tinnitus left ear  Patient had extensive evaluation by ENT    Chronic vertigo in am   Balance is OFF - resolves rather quickly  Left ear tinnitus is unchanged    CT sinuses- left sided septum deviation  Patient did not follow-up with ENT to discuss results of CT sinuses  Review of Systems   Review of Systems   Constitutional: Negative  HENT: Positive for congestion, postnasal drip, sore throat and tinnitus  Eyes: Negative  Respiratory: Positive for cough  Negative for chest tightness, shortness of breath and wheezing  Cardiovascular: Negative  Gastrointestinal: Negative  Musculoskeletal: Negative  Allergic/Immunologic: Negative  Neurological: Negative  Hematological: Negative  Psychiatric/Behavioral: Negative          Active Problem List     Patient Active Problem List   Diagnosis    Disc degeneration, lumbar    Fatty liver    Hypertension    Diabetes mellitus type 2 in obese ([address] )    Traumatic tear of right rotator cuff    Tinnitus of left ear       Past Medical History:  Past Medical History:   Diagnosis Date    Environmental allergies        Past Surgical History:  Past Surgical History:   Procedure Laterality Date    FL INJECTION RIGHT SHOULDER (ARTHROGRAM)  6/1/2021    HERNIA REPAIR      Last Assessed:  12/29/16       Family History:  Family History   Problem Relation Age of Onset    Breast cancer Mother     Other Mother         Laryngeal cancer    Colon cancer Family        Social History:  Social History     Socioeconomic History    Marital status: /Civil Union     Spouse name: Not on file    Number of children: Not on file    Years of education: Not on file    Highest education level: Not on file   Occupational History    Not on file   Tobacco Use    Smoking status: Never Smoker    Smokeless tobacco: Never Used Vaping Use    Vaping Use: Never used   Substance and Sexual Activity    Alcohol use: Yes     Comment: social    Drug use: No    Sexual activity: Yes   Other Topics Concern    Not on file   Social History Narrative    Not on file     Social Determinants of Health     Financial Resource Strain: Not on file   Food Insecurity: Not on file   Transportation Needs: Not on file   Physical Activity: Not on file   Stress: Not on file   Social Connections: Not on file   Intimate Partner Violence: Not on file   Housing Stability: Not on file         Objective     Vitals:    02/17/22 0748   BP: 132/80   BP Location: Left arm   Patient Position: Sitting   Cuff Size: Large   Pulse: 74   Resp: 20   Temp: 97 8 °F (36 6 °C)   TempSrc: Temporal   SpO2: 100%   Weight: 85 7 kg (189 lb)   Height: 5' 6" (1 676 m)       Wt Readings from Last 3 Encounters:   02/17/22 85 7 kg (189 lb)   09/02/21 86 2 kg (190 lb)   07/26/21 83 kg (183 lb)       Physical Exam  Vitals and nursing note reviewed  Constitutional:       General: He is not in acute distress  Appearance: Normal appearance  He is well-developed  He is not ill-appearing  HENT:      Head: Normocephalic and atraumatic  Right Ear: Tympanic membrane and ear canal normal       Left Ear: Tympanic membrane and ear canal normal       Nose: Mucosal edema present  Mouth/Throat:      Pharynx: Posterior oropharyngeal erythema ( postnasal drip) present  No oropharyngeal exudate  Eyes:      Conjunctiva/sclera: Conjunctivae normal    Cardiovascular:      Rate and Rhythm: Normal rate and regular rhythm  Heart sounds: Normal heart sounds  No murmur heard  Pulmonary:      Effort: Pulmonary effort is normal  No respiratory distress  Breath sounds: No wheezing or rales  Musculoskeletal:         General: Normal range of motion  Cervical back: Neck supple  Lymphadenopathy:      Cervical: Cervical adenopathy (Submandibular) present     Skin:     General: Skin is warm  Neurological:      Mental Status: He is alert and oriented to person, place, and time     Psychiatric:         Behavior: Behavior normal           Pertinent Laboratory/Diagnostic Studies:    Lab Results   Component Value Date    WBC 7 39 03/29/2021    HGB 16 0 03/29/2021    HCT 46 8 03/29/2021    MCV 95 03/29/2021     03/29/2021       No results found for: TSH    Lab Results   Component Value Date    CHOL 209 (H) 01/11/2017     Lab Results   Component Value Date    TRIG 65 03/29/2021     Lab Results   Component Value Date    HDL 62 03/29/2021     Lab Results   Component Value Date    LDLCALC 97 03/29/2021     Lab Results   Component Value Date    HGBA1C 7 6 (H) 06/29/2021     Lab Results   Component Value Date    SODIUM 138 06/29/2021    K 4 2 06/29/2021     06/29/2021    CO2 28 06/29/2021    AGAP 3 (L) 06/29/2021    BUN 18 06/29/2021    CREATININE 0 75 06/29/2021    GLUC 129 06/19/2019    GLUF 156 (H) 06/29/2021    CALCIUM 8 9 06/29/2021    AST 28 03/29/2021    ALT 52 03/29/2021    ALKPHOS 94 03/29/2021    PROT 7 1 01/11/2017    TP 7 5 03/29/2021    BILITOT 0 6 01/11/2017    TBILI 0 80 03/29/2021    EGFR 98 06/29/2021       Orders Placed This Encounter   Procedures    CBC and differential    Comprehensive metabolic panel    Lipid Panel with Direct LDL reflex    TSH, 3rd generation    Microalbumin / creatinine urine ratio    PSA, Total Screen    Hemoglobin A1C    Ambulatory Referral to Otolaryngology       ALLERGIES:  No Known Allergies    Current Medications     Current Outpatient Medications   Medication Sig Dispense Refill    cefprosil (CEFZIL) 500 MG tablet Take 1 tablet (500 mg total) by mouth 2 (two) times a day for 10 days 20 tablet 0    fluticasone (FLONASE) 50 mcg/act nasal spray 1 spray into each nostril daily (Patient not taking: Reported on 7/26/2021) 9 9 mL 0    meloxicam (MOBIC) 15 mg tablet Take 1 tablet once a day after food as needed for groin pain/joint pain (Patient not taking: Reported on 9/2/2021) 30 tablet 1    metFORMIN (GLUCOPHAGE-XR) 500 mg 24 hr tablet Take 1 tablet once a day with dinner for 7 days, then increase to  2 tablets once a day at dinner time (Patient not taking: Reported on 9/2/2021) 60 tablet 3     No current facility-administered medications for this visit         Medications Discontinued During This Encounter   Medication Reason    predniSONE 10 mg tablet Therapy completed       Health Maintenance     Health Maintenance   Topic Date Due    Hepatitis C Screening  Never done    COVID-19 Vaccine (1) Never done    Pneumococcal Vaccine: Pediatrics (0 to 5 Years) and At-Risk Patients (6 to 59 Years) (1 of 2 - PPSV23) Never done    DM Eye Exam  Never done    HIV Screening  Never done    HEMOGLOBIN A1C  12/29/2021    BMI: Followup Plan  03/22/2022    Annual Physical  03/22/2022    Influenza Vaccine (1) 06/30/2022 (Originally 9/1/2021)    URINE MICROALBUMIN  06/29/2022    Diabetic Foot Exam  07/26/2022    Depression Screening  09/02/2022    BMI: Adult  02/17/2023    Colorectal Cancer Screening  04/20/2026    DTaP,Tdap,and Td Vaccines (2 - Td or Tdap) 09/02/2031    HIB Vaccine  Aged Out    Hepatitis B Vaccine  Aged Out    IPV Vaccine  Aged Out    Hepatitis A Vaccine  Aged Out    Meningococcal ACWY Vaccine  Aged Out    HPV Vaccine  Aged [employer] History   Administered Date(s) Administered    Tdap 09/02/2021       Pradeep Navas MD

## 2022-02-17 NOTE — ASSESSMENT & PLAN NOTE
Lab Results   Component Value Date    HGBA1C 7 6 (H) 06/29/2021   No medications  Patient is managing with diet  He is due for blood work

## 2022-02-28 ENCOUNTER — TELEPHONE (OUTPATIENT)
Dept: FAMILY MEDICINE CLINIC | Facility: CLINIC | Age: 62
End: 2022-02-28

## 2022-02-28 DIAGNOSIS — J32.4 CHRONIC PANSINUSITIS: Primary | ICD-10-CM

## 2022-02-28 RX ORDER — METHYLPREDNISOLONE 4 MG/1
TABLET ORAL
Qty: 21 EACH | Refills: 0 | Status: SHIPPED | OUTPATIENT
Start: 2022-02-28 | End: 2022-04-12

## 2022-02-28 NOTE — TELEPHONE ENCOUNTER
Patient calling to advise he is still experiencing swollen glands and post nasal drip  Asking for recommendation on how to proceed

## 2022-02-28 NOTE — TELEPHONE ENCOUNTER
I will send prescription for Medrol Dosepak to relieve postnasal drip and gland swelling symptoms  Patient should proceed with follow-up by ENT and discuss results of CT sinuses, as per our recent conversation    Thank you

## 2022-03-30 ENCOUNTER — APPOINTMENT (OUTPATIENT)
Dept: LAB | Facility: CLINIC | Age: 62
End: 2022-03-30
Payer: COMMERCIAL

## 2022-03-30 ENCOUNTER — TELEPHONE (OUTPATIENT)
Dept: FAMILY MEDICINE CLINIC | Facility: CLINIC | Age: 62
End: 2022-03-30

## 2022-03-30 DIAGNOSIS — E11.21 DIABETIC NEPHROPATHY ASSOCIATED WITH TYPE 2 DIABETES MELLITUS (HCC): ICD-10-CM

## 2022-03-30 DIAGNOSIS — E66.9 DIABETES MELLITUS TYPE 2 IN OBESE (HCC): ICD-10-CM

## 2022-03-30 DIAGNOSIS — Z12.5 PROSTATE CANCER SCREENING: ICD-10-CM

## 2022-03-30 DIAGNOSIS — E11.69 DIABETES MELLITUS TYPE 2 IN OBESE (HCC): ICD-10-CM

## 2022-03-30 LAB
ALBUMIN SERPL BCP-MCNC: 4 G/DL (ref 3.5–5)
ALP SERPL-CCNC: 104 U/L (ref 46–116)
ALT SERPL W P-5'-P-CCNC: 55 U/L (ref 12–78)
ANION GAP SERPL CALCULATED.3IONS-SCNC: 5 MMOL/L (ref 4–13)
AST SERPL W P-5'-P-CCNC: 27 U/L (ref 5–45)
BASOPHILS # BLD AUTO: 0.04 THOUSANDS/ΜL (ref 0–0.1)
BASOPHILS NFR BLD AUTO: 1 % (ref 0–1)
BILIRUB SERPL-MCNC: 0.88 MG/DL (ref 0.2–1)
BUN SERPL-MCNC: 13 MG/DL (ref 5–25)
CALCIUM SERPL-MCNC: 9.2 MG/DL (ref 8.3–10.1)
CHLORIDE SERPL-SCNC: 103 MMOL/L (ref 100–108)
CHOLEST SERPL-MCNC: 196 MG/DL
CO2 SERPL-SCNC: 30 MMOL/L (ref 21–32)
CREAT SERPL-MCNC: 0.87 MG/DL (ref 0.6–1.3)
CREAT UR-MCNC: 159 MG/DL
EOSINOPHIL # BLD AUTO: 0.12 THOUSAND/ΜL (ref 0–0.61)
EOSINOPHIL NFR BLD AUTO: 2 % (ref 0–6)
ERYTHROCYTE [DISTWIDTH] IN BLOOD BY AUTOMATED COUNT: 11.4 % (ref 11.6–15.1)
EST. AVERAGE GLUCOSE BLD GHB EST-MCNC: 194 MG/DL
GFR SERPL CREATININE-BSD FRML MDRD: 91 ML/MIN/1.73SQ M
GLUCOSE P FAST SERPL-MCNC: 191 MG/DL (ref 65–99)
HBA1C MFR BLD: 8.4 %
HCT VFR BLD AUTO: 48 % (ref 36.5–49.3)
HDLC SERPL-MCNC: 52 MG/DL
HGB BLD-MCNC: 17.3 G/DL (ref 12–17)
IMM GRANULOCYTES # BLD AUTO: 0.01 THOUSAND/UL (ref 0–0.2)
IMM GRANULOCYTES NFR BLD AUTO: 0 % (ref 0–2)
LDLC SERPL CALC-MCNC: 120 MG/DL (ref 0–100)
LYMPHOCYTES # BLD AUTO: 2.69 THOUSANDS/ΜL (ref 0.6–4.47)
LYMPHOCYTES NFR BLD AUTO: 42 % (ref 14–44)
MCH RBC QN AUTO: 32.6 PG (ref 26.8–34.3)
MCHC RBC AUTO-ENTMCNC: 36 G/DL (ref 31.4–37.4)
MCV RBC AUTO: 90 FL (ref 82–98)
MICROALBUMIN UR-MCNC: 109 MG/L (ref 0–20)
MICROALBUMIN/CREAT 24H UR: 69 MG/G CREATININE (ref 0–30)
MONOCYTES # BLD AUTO: 0.55 THOUSAND/ΜL (ref 0.17–1.22)
MONOCYTES NFR BLD AUTO: 9 % (ref 4–12)
NEUTROPHILS # BLD AUTO: 3.07 THOUSANDS/ΜL (ref 1.85–7.62)
NEUTS SEG NFR BLD AUTO: 46 % (ref 43–75)
NRBC BLD AUTO-RTO: 0 /100 WBCS
PLATELET # BLD AUTO: 240 THOUSANDS/UL (ref 149–390)
PMV BLD AUTO: 10.1 FL (ref 8.9–12.7)
POTASSIUM SERPL-SCNC: 4.3 MMOL/L (ref 3.5–5.3)
PROT SERPL-MCNC: 7.8 G/DL (ref 6.4–8.2)
PSA SERPL-MCNC: 2.7 NG/ML (ref 0–4)
RBC # BLD AUTO: 5.31 MILLION/UL (ref 3.88–5.62)
SODIUM SERPL-SCNC: 138 MMOL/L (ref 136–145)
TRIGL SERPL-MCNC: 122 MG/DL
TSH SERPL DL<=0.05 MIU/L-ACNC: 2.96 UIU/ML (ref 0.36–3.74)
WBC # BLD AUTO: 6.48 THOUSAND/UL (ref 4.31–10.16)

## 2022-03-30 PROCEDURE — 82043 UR ALBUMIN QUANTITATIVE: CPT

## 2022-03-30 PROCEDURE — 85025 COMPLETE CBC W/AUTO DIFF WBC: CPT

## 2022-03-30 PROCEDURE — 80061 LIPID PANEL: CPT

## 2022-03-30 PROCEDURE — 36415 COLL VENOUS BLD VENIPUNCTURE: CPT

## 2022-03-30 PROCEDURE — 83036 HEMOGLOBIN GLYCOSYLATED A1C: CPT

## 2022-03-30 PROCEDURE — 80053 COMPREHEN METABOLIC PANEL: CPT

## 2022-03-30 PROCEDURE — 82570 ASSAY OF URINE CREATININE: CPT

## 2022-03-30 PROCEDURE — 3060F POS MICROALBUMINURIA REV: CPT | Performed by: FAMILY MEDICINE

## 2022-03-30 PROCEDURE — G0103 PSA SCREENING: HCPCS

## 2022-03-30 PROCEDURE — 84443 ASSAY THYROID STIM HORMONE: CPT

## 2022-03-30 PROCEDURE — 3066F NEPHROPATHY DOC TX: CPT | Performed by: FAMILY MEDICINE

## 2022-03-30 PROCEDURE — 3052F HG A1C>EQUAL 8.0%<EQUAL 9.0%: CPT | Performed by: FAMILY MEDICINE

## 2022-03-30 NOTE — TELEPHONE ENCOUNTER
Please contact patient or his wife  I reviewed results of blood work  · Hemoglobin A1c is high at 8 4     · Urine microalbumin is elevated  Patient needs to start metformin ASAP and schedule follow-up regarding treatment of type type 2 diabetes with me within next few weeks  Prescription for metformin was sent back in June 2021, I know patient never took it back then  If he needs a new prescription-please let me know        Thank you

## 2022-03-31 NOTE — TELEPHONE ENCOUNTER
Spoke with patient  Made aware of results and provider's instructions  Patient verbalized understanding and was agreeable w/ plan  Pt does not need a new Rx    Apt scheduled for 4/12th at 4pm

## 2022-04-06 NOTE — PROGRESS NOTES
PT Evaluation     Today's date: 2022  Patient name: Dina Montalvo  : 1960  MRN: [id number]  Referring provider: Felisha Leavitt MD  Dx: No diagnosis found                 Assessment/Plan    Subjective    Objective           Precautions: ***      Manuals                                                                 Neuro Re-Ed                                                                                                        Ther Ex                                                                                                                     Ther Activity                                       Gait Training                                       Modalities

## 2022-04-07 ENCOUNTER — APPOINTMENT (OUTPATIENT)
Dept: PHYSICAL THERAPY | Facility: REHABILITATION | Age: 62
End: 2022-04-07

## 2022-04-12 ENCOUNTER — OFFICE VISIT (OUTPATIENT)
Dept: FAMILY MEDICINE CLINIC | Facility: CLINIC | Age: 62
End: 2022-04-12
Payer: COMMERCIAL

## 2022-04-12 VITALS
TEMPERATURE: 98.4 F | RESPIRATION RATE: 16 BRPM | HEART RATE: 69 BPM | WEIGHT: 189 LBS | HEIGHT: 66 IN | DIASTOLIC BLOOD PRESSURE: 70 MMHG | BODY MASS INDEX: 30.37 KG/M2 | SYSTOLIC BLOOD PRESSURE: 130 MMHG | OXYGEN SATURATION: 96 %

## 2022-04-12 DIAGNOSIS — E11.69 DIABETES MELLITUS TYPE 2 IN OBESE (HCC): Primary | Chronic | ICD-10-CM

## 2022-04-12 DIAGNOSIS — E11.29 MICROALBUMINURIA DUE TO TYPE 2 DIABETES MELLITUS (HCC): ICD-10-CM

## 2022-04-12 DIAGNOSIS — E66.9 DIABETES MELLITUS TYPE 2 IN OBESE (HCC): Primary | Chronic | ICD-10-CM

## 2022-04-12 DIAGNOSIS — K11.20 PAROTIDITIS: ICD-10-CM

## 2022-04-12 DIAGNOSIS — R80.9 MICROALBUMINURIA DUE TO TYPE 2 DIABETES MELLITUS (HCC): ICD-10-CM

## 2022-04-12 PROCEDURE — 3066F NEPHROPATHY DOC TX: CPT | Performed by: FAMILY MEDICINE

## 2022-04-12 PROCEDURE — 3078F DIAST BP <80 MM HG: CPT | Performed by: FAMILY MEDICINE

## 2022-04-12 PROCEDURE — 3725F SCREEN DEPRESSION PERFORMED: CPT | Performed by: FAMILY MEDICINE

## 2022-04-12 PROCEDURE — 3075F SYST BP GE 130 - 139MM HG: CPT | Performed by: FAMILY MEDICINE

## 2022-04-12 PROCEDURE — 99214 OFFICE O/P EST MOD 30 MIN: CPT | Performed by: FAMILY MEDICINE

## 2022-04-12 PROCEDURE — 1036F TOBACCO NON-USER: CPT | Performed by: FAMILY MEDICINE

## 2022-04-12 PROCEDURE — 3008F BODY MASS INDEX DOCD: CPT | Performed by: FAMILY MEDICINE

## 2022-04-12 RX ORDER — BLOOD SUGAR DIAGNOSTIC
STRIP MISCELLANEOUS
Qty: 100 EACH | Refills: 3 | Status: SHIPPED | OUTPATIENT
Start: 2022-04-12

## 2022-04-12 RX ORDER — BLOOD-GLUCOSE METER
KIT MISCELLANEOUS
Qty: 1 KIT | Refills: 0 | Status: SHIPPED | OUTPATIENT
Start: 2022-04-12

## 2022-04-12 RX ORDER — AMOXICILLIN AND CLAVULANATE POTASSIUM 875; 125 MG/1; MG/1
1 TABLET, FILM COATED ORAL EVERY 12 HOURS SCHEDULED
Qty: 20 TABLET | Refills: 0 | Status: SHIPPED | OUTPATIENT
Start: 2022-04-12 | End: 2022-04-22

## 2022-04-12 RX ORDER — LANCETS 33 GAUGE
EACH MISCELLANEOUS
Qty: 100 EACH | Refills: 3 | Status: SHIPPED | OUTPATIENT
Start: 2022-04-12

## 2022-04-12 NOTE — PROGRESS NOTES
FAMILY PRACTICE OFFICE VISIT       NAME: Gentry Cox  AGE: 60 y o  SEX: male       : 1960        MRN: [id number]        Assessment and Plan     1  Diabetes mellitus type 2 in obese [facility])  Assessment & Plan:    Lab Results   Component Value Date    HGBA1C 8 4 (H) 2022     · Metformin ER 1000 mg daily  · Patient should start Accu-Cheks , he will update me via my chart in 2 weeks   · Goal for fast blood sugar to be below 125  · Referral for diabetic eye exam  · Reassess labs and follow-up in 3 months  · Patient has already made significant changes in diet and regular physical activity, I encouraged him to continue with weight loss, exercise, healthy eating habits  Orders:  -     Hemoglobin A1C; Future; Expected date: 2022  -     Comprehensive metabolic panel; Future; Expected date: 2022  -     Microalbumin / creatinine urine ratio; Future; Expected date: 2022  -     Blood Glucose Monitoring Suppl (OneTouch Verio Reflect) w/Device KIT; Check blood sugars once daily  Please substitute with appropriate alternative as covered by patient's insurance  Dx: E11 65  -     glucose blood (OneTouch Verio) test strip; Check blood sugars once daily  Please substitute with appropriate alternative as covered by patient's insurance  Dx: E11 65  -     OneTouch Delica Lancets 23F MISC; Check blood sugars once daily  Please substitute with appropriate alternative as covered by patient's insurance  Dx: E11 65    2  Parotiditis  Assessment & Plan:  Acute parotiditis/ sialoadenitis  Warm compresses  Ibuprofen 600 milligrams q 6 8 hours p r n  Augmentinx 10 days  Patient/wife will contact me in a few days if symptoms are not improving      Orders:  -     amoxicillin-clavulanate (Augmentin) 875-125 mg per tablet; Take 1 tablet by mouth every 12 (twelve) hours for 10 days    3   Microalbuminuria due to type 2 diabetes mellitus [facility])  Assessment & Plan:    Lab Results   Component Value Date    HGBA1C 8 4 (H) 03/30/2022     Elevated urine microalbumin  Recent onset of type 2 diabetes  Patient just started treatment with metformin ER 1000 mg daily  Hold off Ace or Arb therapy for now  His blood pressure is well controlled  I suspect that transient elevation of urine microalbumin could be related to uncontrolled hyperglycemia within past few months  I advised patient to drink plenty of fluids  Repeat urine microalbumin in 3 months           BMI Counseling: Body mass index is 30 51 kg/m²  The BMI is above normal  Nutrition recommendations include encouraging healthy choices of fruits and vegetables, consuming healthier snacks, moderation in carbohydrate intake, reducing intake of saturated and trans fat and reducing intake of cholesterol  Exercise recommendations include exercising 3-5 times per week  No pharmacotherapy was ordered  Patient referred to PCP  Rationale for BMI follow-up plan is due to patient being overweight or obese  Depression Screening and Follow-up Plan: Patient was screened for depression during today's encounter  They screened negative with a PHQ-2 score of 0  There are no Patient Instructions on file for this visit  Return in 3 months (on 7/12/2022) for Diabetes Follow-up  Discussed with the patient and all questioned fully answered  He will call me if any problems arise  M*Modal software was used to dictate this note  It may contain errors with dictating incorrect words/spelling  Please contact provider directly with any questions  Chief Complaint     Chief Complaint   Patient presents with    Follow-up    Jaw Pain     TMJ Therapy will begin next week     Swollen Glands       History of Present Illness     Follow up type 2 DM  Follow up ENT evaluation  Patient is here today accompanied by his wife  Results of recent blood work reviewed  Hemoglobin A1c is high at 8 4  Elevated urine microalbumin    Patient started taking metformin after results of blood work   He and his wife have been making significant changes in lifestyle and dietary changes  So far, patient tolerates metformin well and denies any adverse side effects of abdominal bloating or diarrhea  Current dose of metformin ER 1000 mg daily  Patient was recently evaluated by ear nose and throat physician for evaluation of ongoing left-sided tinnitus and chronic nasal congestion  ENT reviewed recent CT sinuses and does not believe that patient is an appropriate surgical candidate at present time  Patient states that actually his sinus symptoms and tinnitus have improved  Lately, he has been experiencing rather severe pain at right TMJ/parotid area, pain started 2 to 3 weeks ago  Patient is afebrile  He has noticed swelling at the area of right lateral cheek  Pain is grabbing him suddenly and is quite severe  It occurs even when he is not chewing or articulating  ENT felt that patient suffers from TMJ and recommended further evaluation with dentist   Patient reports subjective sensation that he is hypersalivating but  occasionally he is experiencing mouth dryness  Review of Systems   Review of Systems   Constitutional: Negative  HENT: Positive for tinnitus  As per HPI   Eyes: Negative  Respiratory: Negative  Cardiovascular: Negative  Gastrointestinal: Negative  Endocrine: Negative  Genitourinary: Negative  Musculoskeletal: Negative  Allergic/Immunologic: Negative  Neurological: Negative  Hematological: Negative  Psychiatric/Behavioral: Negative          Active Problem List     Patient Active Problem List   Diagnosis    Disc degeneration, lumbar    Fatty liver    Diabetes mellitus type 2 in obese ([address] )    Traumatic tear of right rotator cuff    Tinnitus of left ear    Parotiditis    Microalbuminuria due to type 2 diabetes mellitus ([facility] [address] )       Past Medical History:  Past Medical History:   Diagnosis Date    Environmental allergies        Past Surgical History:  Past Surgical History:   Procedure Laterality Date    FL INJECTION RIGHT SHOULDER (ARTHROGRAM)  6/1/2021    HERNIA REPAIR      Last Assessed:  12/29/16       Family History:  Family History   Problem Relation Age of Onset    Breast cancer Mother     Other Mother         Laryngeal cancer    Colon cancer Family        Social History:  Social History     Socioeconomic History    Marital status: /Civil Union     Spouse name: Not on file    Number of children: Not on file    Years of education: Not on file    Highest education level: Not on file   Occupational History    Not on file   Tobacco Use    Smoking status: Never Smoker    Smokeless tobacco: Never Used   Vaping Use    Vaping Use: Never used   Substance and Sexual Activity    Alcohol use: Yes     Comment: social    Drug use: No    Sexual activity: Yes   Other Topics Concern    Not on file   Social History Narrative    Not on file     Social Determinants of Health     Financial Resource Strain: Not on file   Food Insecurity: Not on file   Transportation Needs: Not on file   Physical Activity: Not on file   Stress: Not on file   Social Connections: Not on file   Intimate Partner Violence: Not on file   Housing Stability: Not on file         Objective     Vitals:    04/12/22 1552   BP: 130/70   BP Location: Left arm   Patient Position: Sitting   Cuff Size: Standard   Pulse: 69   Resp: 16   Temp: 98 4 °F (36 9 °C)   TempSrc: Temporal   SpO2: 96%   Weight: 85 7 kg (189 lb)   Height: 5' 6" (1 676 m)       Wt Readings from Last 3 Encounters:   04/12/22 85 7 kg (189 lb)   03/18/22 85 7 kg (189 lb)   02/17/22 85 7 kg (189 lb)       Physical Exam  Vitals and nursing note reviewed  Constitutional:       General: He is not in acute distress  Appearance: Normal appearance  He is well-developed  He is not ill-appearing  HENT:      Head: Normocephalic and atraumatic        Salivary Glands: Right salivary gland is diffusely enlarged  Right Ear: Tympanic membrane and ear canal normal       Left Ear: Tympanic membrane and ear canal normal    Eyes:      Conjunctiva/sclera: Conjunctivae normal    Neck:      Vascular: No carotid bruit  Cardiovascular:      Rate and Rhythm: Normal rate and regular rhythm  Heart sounds: Normal heart sounds  No murmur heard  Pulmonary:      Effort: Pulmonary effort is normal  No respiratory distress  Breath sounds: Normal breath sounds  No wheezing or rales  Abdominal:      General: Bowel sounds are normal  There is no distension or abdominal bruit  Musculoskeletal:         General: Normal range of motion  Cervical back: Neck supple  Neurological:      Mental Status: He is alert and oriented to person, place, and time  Cranial Nerves: No cranial nerve deficit     Psychiatric:         Behavior: Behavior normal           Pertinent Laboratory/Diagnostic Studies:    Lab Results   Component Value Date    WBC 6 48 03/30/2022    HGB 17 3 (H) 03/30/2022    HCT 48 0 03/30/2022    MCV 90 03/30/2022     03/30/2022       No results found for: TSH    Lab Results   Component Value Date    CHOL 209 (H) 01/11/2017     Lab Results   Component Value Date    TRIG 122 03/30/2022     Lab Results   Component Value Date    HDL 52 03/30/2022     Lab Results   Component Value Date    LDLCALC 120 (H) 03/30/2022     Lab Results   Component Value Date    HGBA1C 8 4 (H) 03/30/2022     Lab Results   Component Value Date    SODIUM 138 03/30/2022    K 4 3 03/30/2022     03/30/2022    CO2 30 03/30/2022    AGAP 5 03/30/2022    BUN 13 03/30/2022    CREATININE 0 87 03/30/2022    GLUC 129 06/19/2019    GLUF 191 (H) 03/30/2022    CALCIUM 9 2 03/30/2022    AST 27 03/30/2022    ALT 55 03/30/2022    ALKPHOS 104 03/30/2022    PROT 7 1 01/11/2017    TP 7 8 03/30/2022    BILITOT 0 6 01/11/2017    TBILI 0 88 03/30/2022    EGFR 91 03/30/2022       Orders Placed This Encounter   Procedures    Hemoglobin A1C    Comprehensive metabolic panel    Microalbumin / creatinine urine ratio       ALLERGIES:  No Known Allergies    Current Medications     Current Outpatient Medications   Medication Sig Dispense Refill    Fluticasone Propionate (Xhance) 93 MCG/ACT EXHU 2 sprays into each nostril in the morning 16 mL 2    metFORMIN (GLUCOPHAGE-XR) 500 mg 24 hr tablet Take 1 tablet once a day with dinner for 7 days, then increase to  2 tablets once a day at dinner time 60 tablet 3    amoxicillin-clavulanate (Augmentin) 875-125 mg per tablet Take 1 tablet by mouth every 12 (twelve) hours for 10 days 20 tablet 0    Blood Glucose Monitoring Suppl (OneTouch Verio Reflect) w/Device KIT Check blood sugars once daily  Please substitute with appropriate alternative as covered by patient's insurance  Dx: E11 65 1 kit 0    glucose blood (OneTouch Verio) test strip Check blood sugars once daily  Please substitute with appropriate alternative as covered by patient's insurance  Dx: E11 65 100 each 3    meloxicam (MOBIC) 15 mg tablet Take 1 tablet once a day after food as needed for groin pain/joint pain (Patient not taking: Reported on 9/2/2021) 30 tablet 1    OneTouch Delica Lancets 47E MISC Check blood sugars once daily  Please substitute with appropriate alternative as covered by patient's insurance  Dx: E11 65 100 each 3     No current facility-administered medications for this visit         Medications Discontinued During This Encounter   Medication Reason    methylPREDNISolone 4 MG tablet therapy pack        Health Maintenance     Health Maintenance   Topic Date Due    Hepatitis C Screening  Never done    Pneumococcal Vaccine: Pediatrics (0 to 5 Years) and At-Risk Patients (6 to 59 Years) (1 of 2 - PPSV23) Never done    DM Eye Exam  Never done    HIV Screening  Never done    BMI: Followup Plan  03/22/2022    Annual Physical  03/22/2022    Influenza Vaccine (1) 06/30/2022 (Originally 9/1/2021)    Diabetic Foot Exam 07/26/2022    HEMOGLOBIN A1C  09/30/2022    URINE MICROALBUMIN  03/30/2023    Depression Screening  04/12/2023    BMI: Adult  04/12/2023    Colorectal Cancer Screening  04/20/2026    DTaP,Tdap,and Td Vaccines (2 - Td or Tdap) 09/02/2031    COVID-19 Vaccine  Completed    HIB Vaccine  Aged Out    Hepatitis B Vaccine  Aged Out    IPV Vaccine  Aged Out    Hepatitis A Vaccine  Aged Out    Meningococcal ACWY Vaccine  Aged Out    HPV Vaccine  Aged Out       Immunization History   Administered Date(s) Administered    COVID-19 J&J (Mobile Pulse) vaccine 0 5 mL 05/07/2021, 12/03/2021    Tdap 09/02/2021       Marin Perdomo MD

## 2022-04-16 PROBLEM — R80.9 MICROALBUMINURIA DUE TO TYPE 2 DIABETES MELLITUS (HCC): Status: ACTIVE | Noted: 2022-04-16

## 2022-04-16 PROBLEM — E11.29 MICROALBUMINURIA DUE TO TYPE 2 DIABETES MELLITUS (HCC): Status: ACTIVE | Noted: 2022-04-16

## 2022-04-16 PROBLEM — E11.29 MICROALBUMINURIA DUE TO TYPE 2 DIABETES MELLITUS (HCC): Chronic | Status: ACTIVE | Noted: 2022-04-16

## 2022-04-16 PROBLEM — R80.9 MICROALBUMINURIA: Status: ACTIVE | Noted: 2022-04-16

## 2022-04-16 PROBLEM — R80.9 MICROALBUMINURIA DUE TO TYPE 2 DIABETES MELLITUS (HCC): Chronic | Status: ACTIVE | Noted: 2022-04-16

## 2022-04-16 PROBLEM — R80.9 MICROALBUMINURIA: Status: RESOLVED | Noted: 2022-04-16 | Resolved: 2022-04-16

## 2022-04-16 NOTE — ASSESSMENT & PLAN NOTE
Acute parotiditis/ sialoadenitis  Warm compresses  Ibuprofen 600 milligrams q 6 8 hours p r n    Augmentinx 10 days  Patient/wife will contact me in a few days if symptoms are not improving

## 2022-04-16 NOTE — ASSESSMENT & PLAN NOTE
Lab Results   Component Value Date    HGBA1C 8 4 (H) 03/30/2022     · Metformin ER 1000 mg daily  · Patient should start Accu-Cheks , he will update me via my chart in 2 weeks   · Goal for fast blood sugar to be below 125  · Referral for diabetic eye exam  · Reassess labs and follow-up in 3 months  · Patient has already made significant changes in diet and regular physical activity, I encouraged him to continue with weight loss, exercise, healthy eating habits

## 2022-04-16 NOTE — ASSESSMENT & PLAN NOTE
Lab Results   Component Value Date    HGBA1C 8 4 (H) 03/30/2022     Elevated urine microalbumin  Recent onset of type 2 diabetes  Patient just started treatment with metformin ER 1000 mg daily  Hold off Ace or Arb therapy for now  His blood pressure is well controlled  I suspect that transient elevation of urine microalbumin could be related to uncontrolled hyperglycemia within past few months  I advised patient to drink plenty of fluids      Repeat urine microalbumin in 3 months

## 2022-05-03 ENCOUNTER — TELEPHONE (OUTPATIENT)
Dept: FAMILY MEDICINE CLINIC | Facility: CLINIC | Age: 62
End: 2022-05-03

## 2022-05-03 DIAGNOSIS — K11.20 PAROTIDITIS: Primary | ICD-10-CM

## 2022-05-03 NOTE — TELEPHONE ENCOUNTER
Spoke with patient  Swelling has not subsided  No other symptoms noted  Still experiencing pain, swelling, and is now experiencing neck pain as well

## 2022-05-03 NOTE — TELEPHONE ENCOUNTER
----- Message from Rogelio Arora sent at 5/3/2022 11:37 AM EDT -----  Regarding: Swollen gland  Hello,  The antibiotic did not work  The right side of my face is still swollen, gland still swollen and continue to have pain    Do you think I should see a neurologist   Mili Garcia

## 2022-05-03 NOTE — TELEPHONE ENCOUNTER
Called pt- N/A- LM for pt to call back and further triage, any other Sx? Did swelling improved at all?

## 2022-05-04 ENCOUNTER — VBI (OUTPATIENT)
Dept: ADMINISTRATIVE | Facility: OTHER | Age: 62
End: 2022-05-04

## 2022-05-04 NOTE — TELEPHONE ENCOUNTER
Please contact patient or his wife  Please advise him to proceed with ASAP ultrasound of parotid and neck area  I should be able to see those results within next 24 hours and then will make plan of action  STAT orders placed      Thank you

## 2022-05-06 ENCOUNTER — HOSPITAL ENCOUNTER (OUTPATIENT)
Dept: ULTRASOUND IMAGING | Facility: HOSPITAL | Age: 62
Discharge: HOME | End: 2022-05-06
Payer: COMMERCIAL

## 2022-05-06 DIAGNOSIS — K11.20 PAROTIDITIS: ICD-10-CM

## 2022-05-06 PROCEDURE — 76536 US EXAM OF HEAD AND NECK: CPT

## 2022-05-10 ENCOUNTER — TELEPHONE (OUTPATIENT)
Dept: FAMILY MEDICINE CLINIC | Facility: CLINIC | Age: 62
End: 2022-05-10

## 2022-05-10 ENCOUNTER — TELEPHONE (OUTPATIENT)
Dept: OTHER | Facility: OTHER | Age: 62
End: 2022-05-10

## 2022-05-10 DIAGNOSIS — G50.0 FACIAL PAIN SYNDROME: Primary | ICD-10-CM

## 2022-05-10 RX ORDER — GABAPENTIN 100 MG/1
CAPSULE ORAL
Qty: 90 CAPSULE | Refills: 1 | Status: SHIPPED | OUTPATIENT
Start: 2022-05-10 | End: 2022-07-07

## 2022-05-10 NOTE — TELEPHONE ENCOUNTER
Spoke with pt, MD instructions given  He will schedule an appt with Neurology and the Dentist    Pt would like a script for Gabapentin as he continues with swelling in his neck with pain that radiates to his teeth

## 2022-05-10 NOTE — TELEPHONE ENCOUNTER
Please contact patient or his wife  I reviewed results of recent ultrasound  It is normal   No evidence of inflammation of parotid gland  If his symptoms are persisting we should explore other possibilities for his severe pain including trigeminal neuralgia or severe TMJ  · I recommend to schedule follow-up with his dentist  · I recommend to schedule consultation with Neurology ASAP, I will place a referral   · If patient's pain persist-I can try him on gabapentin, medication that can alleviate pain along the nerve  Please let me know if he has still symptomatic and if he is interested in trial of this medication          Thank you

## 2022-05-10 NOTE — ASSESSMENT & PLAN NOTE
Negative carotid ultrasound  Treated with Augmentin  ENT suspects TMJ  Symptoms occur regardless of articulation and chewing      Rule out trigeminal neuralgia more referral to Neurology  Negative brain MRI August 2021

## 2022-06-07 ENCOUNTER — OFFICE VISIT (OUTPATIENT)
Dept: FAMILY MEDICINE CLINIC | Facility: CLINIC | Age: 62
End: 2022-06-07
Payer: COMMERCIAL

## 2022-06-07 VITALS
BODY MASS INDEX: 29.57 KG/M2 | SYSTOLIC BLOOD PRESSURE: 140 MMHG | HEIGHT: 66 IN | WEIGHT: 184 LBS | DIASTOLIC BLOOD PRESSURE: 60 MMHG | RESPIRATION RATE: 16 BRPM | OXYGEN SATURATION: 97 % | HEART RATE: 76 BPM | TEMPERATURE: 97.8 F

## 2022-06-07 DIAGNOSIS — R59.0 ANTERIOR CERVICAL LYMPHADENOPATHY: ICD-10-CM

## 2022-06-07 DIAGNOSIS — H02.401 PTOSIS OF RIGHT EYELID: ICD-10-CM

## 2022-06-07 DIAGNOSIS — G50.0 FACIAL PAIN SYNDROME: ICD-10-CM

## 2022-06-07 DIAGNOSIS — R20.2 FACIAL PARESTHESIA: Primary | ICD-10-CM

## 2022-06-07 PROCEDURE — 1036F TOBACCO NON-USER: CPT | Performed by: FAMILY MEDICINE

## 2022-06-07 PROCEDURE — 3078F DIAST BP <80 MM HG: CPT | Performed by: FAMILY MEDICINE

## 2022-06-07 PROCEDURE — 99214 OFFICE O/P EST MOD 30 MIN: CPT | Performed by: FAMILY MEDICINE

## 2022-06-07 PROCEDURE — 3008F BODY MASS INDEX DOCD: CPT | Performed by: FAMILY MEDICINE

## 2022-06-07 PROCEDURE — 3077F SYST BP >= 140 MM HG: CPT | Performed by: FAMILY MEDICINE

## 2022-06-07 NOTE — PROGRESS NOTES
FAMILY PRACTICE OFFICE VISIT       NAME: Karl Irene  AGE: 60 y o  SEX: male       : 1960        MRN: [id number]        Assessment and Plan     1  Facial paresthesia  -     MRI brain w wo contrast; Future; Expected date: 2022  -     CT soft tissue neck wo contrast; Future; Expected date: 2022    2  Anterior cervical lymphadenopathy  -     CT soft tissue neck wo contrast; Future; Expected date: 2022    3  Facial pain syndrome  -     MRI brain w wo contrast; Future; Expected date: 2022  -     CT soft tissue neck wo contrast; Future; Expected date: 2022    4  Ptosis of right eyelid  -     MRI brain w wo contrast; Future; Expected date: 2022    Patient presents for evaluation of ongoing symptoms of right TMJ area pain, facial and right parietal paresthesias, interim development of tender right submandibular cervical lymphadenopathy  He reports new occurrence of right upper eyelid ptosis, no visual changes  Recent extensive evaluation by dentist, diagnosis of TMJ was not confirmed  Patient reports subjective sensation of difficulty swallowing, exam reveals postnasal drip  He has been managing symptoms with antihistamines, decongestants, anti-inflammatories  No relief after trial of gabapentin  Previous unremarkable brain MRI in 2021  Current appointment with Neurology is set up for October/ available appointment  Patient will proceed with CT neck and brain MRI  I will determine further course of action based on results of those studies  We will be in touch with [facility] to facilitate sooner appointment for this patient  There are no Patient Instructions on file for this visit  Return if symptoms worsen or fail to improve, for Pending imaging results  Discussed with the patient and all questioned fully answered  He will call me if any problems arise  M*Modal software was used to dictate this note   It may contain errors with dictating incorrect words/spelling  Please contact provider directly with any questions  Chief Complaint     Chief Complaint   Patient presents with    Neck Pain     Continues-"gland in neck swelling"    Eye droopy     Right sided   Mouth numbness     Right sided-has a drool sensation     Diabetic Foot Exam     Shoes and socks removed    Diabetic Eye Exam     Has paper to give to doctor when seen     Hb A1C and Microalbumin     Pt will have completed at lab  History of Present Illness     Patient presents for follow-up  He is here today accompanied by his wife  I saw him last time for evaluation of right TMJ area pain back in April  At that time I felt that patient's symptoms could be triggered by acute parotiditis  Patient has completed course of Augmentin for 10 days without any significant improvement  He is describing ongoing discomfort in his right anterior neck and right face/TMJ area  Patient reports sensation of constant subjective drooling without any action symptoms of drooling on the right side of the mouth  Patient has been experiencing persistent numbness at the corner of his right mouth  Ongoing pain at TMJ area with biting, occasional right earache, shooting in nature  Patient and his wife has also noticed that his right upper eyelid became somewhat droopy  He denies any visual changes  No ocular pain  Pain is at right submandibular gland and underneath right ear  Dull headache on the right sided times  Ongoing sensation of right parietal paresthesia or, numbing sensation  No migraines  No generalized headache per se  Constant achiness at right TMJ area  Patient has felt tender right submandibular gland within past few weeks  Seen by dentist few days ago- no definitive Dx of TMJ : No explanation of symptoms dentist standpoint  Patient was referred to oral surgeon  Dental films were normal     Patient was referred for further evaluation with Neurology  Currently his appointment is only scheduled for October  Aside from above-named symptoms, patient has been feeling generally well  He follows healthy diet  He lost few lb of weight in an effort to improve hemoglobin A1c  He will be proceeding with complete blood work by end June  Patient had previous evaluation by [facility], Enrique Link 3/18/22  No help with gabapentin that was Rxed a month ago, no improvement  Patient states that he feels that his throat is "closing" at times, ongoing postnasal drip  Uses Tylenol sinus and antihistamines without significant improvement  No visual changes    Chronic left sided ear pressure - "cotton ball sensation", extensive previous workup in the past for this symptom with negative MRI brain and IAC's in August 2021  Neck Pain   Associated symptoms include headaches (Right-sided TMJ pain) and numbness ( as per HPI, paresthesias right mouth, right parietal area)  Review of Systems   Review of Systems   Constitutional: Negative  HENT: Negative  Eyes: Negative  Negative for visual disturbance  Respiratory: Negative  Cardiovascular: Negative  Gastrointestinal: Negative  Endocrine: Negative  Musculoskeletal: Positive for neck pain ( anterior)  Allergic/Immunologic: Negative  Neurological: Positive for numbness ( as per HPI, paresthesias right mouth, right parietal area) and headaches (Right-sided TMJ pain)  Negative for dizziness, syncope and speech difficulty  Hematological: Positive for adenopathy ( tender submandibular gland on the right)  Psychiatric/Behavioral: Negative          Active Problem List     Patient Active Problem List   Diagnosis    Disc degeneration, lumbar    Fatty liver    Diabetes mellitus type 2 in obese ([address] )    Traumatic tear of right rotator cuff    Tinnitus of left ear    Parotiditis    Microalbuminuria due to type 2 diabetes mellitus ([address] )    Facial pain syndrome       Past Medical History:  Past Medical History:   Diagnosis Date    Environmental allergies        Past Surgical History:  Past Surgical History:   Procedure Laterality Date    FL INJECTION RIGHT SHOULDER (ARTHROGRAM)  6/1/2021    HERNIA REPAIR      Last Assessed:  12/29/16       Family History:  Family History   Problem Relation Age of Onset    Breast cancer Mother     Other Mother         Laryngeal cancer    Colon cancer Family        Social History:  Social History     Socioeconomic History    Marital status: /Civil Union     Spouse name: Not on file    Number of children: Not on file    Years of education: Not on file    Highest education level: Not on file   Occupational History    Not on file   Tobacco Use    Smoking status: Never Smoker    Smokeless tobacco: Never Used   Vaping Use    Vaping Use: Never used   Substance and Sexual Activity    Alcohol use: Yes     Comment: social    Drug use: No    Sexual activity: Yes   Other Topics Concern    Not on file   Social History Narrative    Not on file     Social Determinants of Health     Financial Resource Strain: Not on file   Food Insecurity: Not on file   Transportation Needs: Not on file   Physical Activity: Not on file   Stress: Not on file   Social Connections: Not on file   Intimate Partner Violence: Not on file   Housing Stability: Not on file         Objective     Vitals:    06/07/22 1538   BP: 140/60   BP Location: Right arm   Patient Position: Sitting   Cuff Size: Standard   Pulse: 76   Resp: 16   Temp: 97 8 °F (36 6 °C)   TempSrc: Temporal   SpO2: 97%   Weight: 83 5 kg (184 lb)   Height: 5' 6" (1 676 m)       Wt Readings from Last 3 Encounters:   06/07/22 83 5 kg (184 lb)   04/12/22 85 7 kg (189 lb)   03/18/22 85 7 kg (189 lb)       Physical Exam  Constitutional:       Appearance: Normal appearance  HENT:      Head: Normocephalic and atraumatic        Right Ear: Tympanic membrane normal       Left Ear: Tympanic membrane and ear canal normal  Mouth/Throat:      Mouth: Mucous membranes are moist       Pharynx: No posterior oropharyngeal erythema  Comments: Light yellow PND  Eyes:      General: No scleral icterus  Extraocular Movements: Extraocular movements intact  Conjunctiva/sclera: Conjunctivae normal       Pupils: Pupils are equal, round, and reactive to light  Comments: Right pupil is smaller that others    Mild ptosis of right upper eyelid   Cardiovascular:      Rate and Rhythm: Normal rate and regular rhythm  Pulses: no weak pulses          Dorsalis pedis pulses are 2+ on the right side and 2+ on the left side  Heart sounds: Normal heart sounds  No murmur heard  Musculoskeletal:      Cervical back: Normal range of motion  Feet:      Right foot:      Skin integrity: No ulcer, skin breakdown, erythema, warmth, callus or dry skin  Left foot:      Skin integrity: No ulcer, skin breakdown, erythema, warmth, callus or dry skin  Lymphadenopathy:      Cervical: Cervical adenopathy (Right submandibular, tender) present  Neurological:      General: No focal deficit present  Mental Status: He is alert and oriented to person, place, and time  Gait: Gait normal    Psychiatric:         Mood and Affect: Mood normal          Behavior: Behavior normal          Thought Content: Thought content normal      Patient's shoes and socks removed  Right Foot/Ankle   Right Foot Inspection  Skin Exam: skin normal and skin intact  No dry skin, no warmth, no callus, no erythema, no maceration, no abnormal color, no pre-ulcer, no ulcer and no callus  Toe Exam: ROM and strength within normal limits  Sensory   Vibration: intact  Proprioception: intact  Monofilament testing: intact    Vascular  The right DP pulse is 2+  Left Foot/Ankle  Left Foot Inspection  Skin Exam: skin normal and skin intact  No dry skin, no warmth, no erythema, no maceration, normal color, no pre-ulcer, no ulcer and no callus       Toe Exam: ROM and strength within normal limits  Sensory   Vibration: intact  Proprioception: intact  Monofilament testing: intact    Vascular  The left DP pulse is 2+  Assign Risk Category  No deformity present  No loss of protective sensation  No weak pulses  Risk: 0         Pertinent Laboratory/Diagnostic Studies:    Lab Results   Component Value Date    WBC 6 48 03/30/2022    HGB 17 3 (H) 03/30/2022    HCT 48 0 03/30/2022    MCV 90 03/30/2022     03/30/2022       No results found for: TSH    Lab Results   Component Value Date    CHOL 209 (H) 01/11/2017     Lab Results   Component Value Date    TRIG 122 03/30/2022     Lab Results   Component Value Date    HDL 52 03/30/2022     Lab Results   Component Value Date    LDLCALC 120 (H) 03/30/2022     Lab Results   Component Value Date    HGBA1C 8 4 (H) 03/30/2022     Lab Results   Component Value Date    SODIUM 138 03/30/2022    K 4 3 03/30/2022     03/30/2022    CO2 30 03/30/2022    AGAP 5 03/30/2022    BUN 13 03/30/2022    CREATININE 0 87 03/30/2022    GLUC 129 06/19/2019    GLUF 191 (H) 03/30/2022    CALCIUM 9 2 03/30/2022    AST 27 03/30/2022    ALT 55 03/30/2022    ALKPHOS 104 03/30/2022    PROT 7 1 01/11/2017    TP 7 8 03/30/2022    BILITOT 0 6 01/11/2017    TBILI 0 88 03/30/2022    EGFR 91 03/30/2022       Orders Placed This Encounter   Procedures    MRI brain w wo contrast    CT soft tissue neck wo contrast       ALLERGIES:  No Known Allergies    Current Medications     Current Outpatient Medications   Medication Sig Dispense Refill    Blood Glucose Monitoring Suppl (OneTouch Verio Reflect) w/Device KIT Check blood sugars once daily  Please substitute with appropriate alternative as covered by patient's insurance   Dx: E11 65 1 kit 0    Fluticasone Propionate (Xhance) 93 MCG/ACT EXHU 2 sprays into each nostril in the morning 16 mL 2    gabapentin (NEURONTIN) 100 mg capsule Take 1 capsule at bedtime for 5 days then increase dose to 2 capsules at bedtime for 5 days, then increase dose up to 3 capsules at bedtime if needed 90 capsule 1    glucose blood (OneTouch Verio) test strip Check blood sugars once daily  Please substitute with appropriate alternative as covered by patient's insurance  Dx: E11 65 100 each 3    metFORMIN (GLUCOPHAGE-XR) 500 mg 24 hr tablet Take 1 tablet once a day with dinner for 7 days, then increase to  2 tablets once a day at dinner time 60 tablet 3    OneTouch Delica Lancets 19J MISC Check blood sugars once daily  Please substitute with appropriate alternative as covered by patient's insurance  Dx: E11 65 100 each 3     No current facility-administered medications for this visit         Medications Discontinued During This Encounter   Medication Reason    meloxicam (MOBIC) 15 mg tablet Therapy completed       Health Maintenance     Health Maintenance   Topic Date Due    Hepatitis C Screening  Never done    Pneumococcal Vaccine: Pediatrics (0 to 5 Years) and At-Risk Patients (6 to 59 Years) (1 - PCV) Never done    DM Eye Exam  Never done    HIV Screening  Never done    Annual Physical  03/22/2022    COVID-19 Vaccine (3 - Booster for [email or web address] series) 04/03/2022    Influenza Vaccine (Season Ended) 09/01/2022    HEMOGLOBIN A1C  09/30/2022    URINE MICROALBUMIN  03/30/2023    Depression Screening  04/12/2023    BMI: Followup Plan  04/16/2023    BMI: Adult  06/07/2023    Diabetic Foot Exam  06/13/2023    Colorectal Cancer Screening  04/20/2026    DTaP,Tdap,and Td Vaccines (2 - Td or Tdap) 09/02/2031    HIB Vaccine  Aged Out    Hepatitis B Vaccine  Aged Out    IPV Vaccine  Aged Out    Hepatitis A Vaccine  Aged Out    Meningococcal ACWY Vaccine  Aged Out    HPV Vaccine  Aged Out       Immunization History   Administered Date(s) Administered    COVID-19 J&J (Ohai) vaccine 0 5 mL 05/07/2021, 12/03/2021    Tdap 09/02/2021       Jason Velez MD

## 2022-06-08 ENCOUNTER — HOSPITAL ENCOUNTER (OUTPATIENT)
Dept: CT IMAGING | Facility: HOSPITAL | Age: 62
Discharge: HOME | End: 2022-06-08
Payer: COMMERCIAL

## 2022-06-08 DIAGNOSIS — G50.0 FACIAL PAIN SYNDROME: ICD-10-CM

## 2022-06-08 DIAGNOSIS — R59.0 ANTERIOR CERVICAL LYMPHADENOPATHY: ICD-10-CM

## 2022-06-08 DIAGNOSIS — R20.2 FACIAL PARESTHESIA: ICD-10-CM

## 2022-06-08 PROCEDURE — 70490 CT SOFT TISSUE NECK W/O DYE: CPT

## 2022-06-08 PROCEDURE — G1004 CDSM NDSC: HCPCS

## 2022-06-13 ENCOUNTER — TELEPHONE (OUTPATIENT)
Dept: FAMILY MEDICINE CLINIC | Facility: CLINIC | Age: 62
End: 2022-06-13

## 2022-06-13 DIAGNOSIS — G50.0 FACIAL PAIN SYNDROME: Primary | ICD-10-CM

## 2022-06-13 DIAGNOSIS — R59.0 ANTERIOR CERVICAL LYMPHADENOPATHY: ICD-10-CM

## 2022-06-13 NOTE — TELEPHONE ENCOUNTER
L/M for pt to return this call   Dr Henderson's phone number [phone] [facility] [address] [facility] [address] [phone]

## 2022-06-13 NOTE — TELEPHONE ENCOUNTER
Please contact patient or his wife  As they have probably seen, CT neck was normal which is good news  I am still bit perplexed about patient's symptoms and palpable lump in his neck during recent examination  I do recommend to proceed with 2nd opinion of ENT, Dr Randy Ramírez  I also would like patient to proceed with blood work to check for connective tissue disease and inflammation, this is an additional blood work, he does not need to fast, orders placed  Please let him know that I am in touch with [facility]'s Neurology trying to move his appointment for earlier date      Thank you

## 2022-06-13 NOTE — ASSESSMENT & PLAN NOTE
Lab Results   Component Value Date    HGBA1C 8 4 (H) 03/30/2022     Continue metformin  Patient follows healthy diet  Pending labs late June      Diabetic foot exam was performed today

## 2022-06-24 ENCOUNTER — TELEPHONE (OUTPATIENT)
Dept: FAMILY MEDICINE CLINIC | Facility: CLINIC | Age: 62
End: 2022-06-24

## 2022-06-24 ENCOUNTER — APPOINTMENT (OUTPATIENT)
Dept: LAB | Facility: CLINIC | Age: 62
End: 2022-06-24
Payer: COMMERCIAL

## 2022-06-24 DIAGNOSIS — G50.0 FACIAL PAIN SYNDROME: Primary | ICD-10-CM

## 2022-06-24 DIAGNOSIS — G50.0 FACIAL PAIN SYNDROME: ICD-10-CM

## 2022-06-24 DIAGNOSIS — E11.69 DIABETES MELLITUS TYPE 2 IN OBESE (HCC): Chronic | ICD-10-CM

## 2022-06-24 DIAGNOSIS — E66.9 DIABETES MELLITUS TYPE 2 IN OBESE (HCC): Chronic | ICD-10-CM

## 2022-06-24 LAB
ALBUMIN SERPL BCP-MCNC: 4.1 G/DL (ref 3.5–5)
ALP SERPL-CCNC: 99 U/L (ref 46–116)
ALT SERPL W P-5'-P-CCNC: 42 U/L (ref 12–78)
ANION GAP SERPL CALCULATED.3IONS-SCNC: 2 MMOL/L (ref 4–13)
AST SERPL W P-5'-P-CCNC: 23 U/L (ref 5–45)
BILIRUB SERPL-MCNC: 0.56 MG/DL (ref 0.2–1)
BUN SERPL-MCNC: 15 MG/DL (ref 5–25)
CALCIUM SERPL-MCNC: 9.3 MG/DL (ref 8.3–10.1)
CHLORIDE SERPL-SCNC: 107 MMOL/L (ref 100–108)
CO2 SERPL-SCNC: 29 MMOL/L (ref 21–32)
CREAT SERPL-MCNC: 0.89 MG/DL (ref 0.6–1.3)
CRP SERPL QL: 4.9 MG/L
ERYTHROCYTE [SEDIMENTATION RATE] IN BLOOD: 32 MM/HOUR (ref 0–19)
EST. AVERAGE GLUCOSE BLD GHB EST-MCNC: 166 MG/DL
GFR SERPL CREATININE-BSD FRML MDRD: 90 ML/MIN/1.73SQ M
GLUCOSE P FAST SERPL-MCNC: 199 MG/DL (ref 65–99)
HBA1C MFR BLD: 7.4 %
POTASSIUM SERPL-SCNC: 4.1 MMOL/L (ref 3.5–5.3)
PROT SERPL-MCNC: 8.4 G/DL (ref 6.4–8.2)
SODIUM SERPL-SCNC: 138 MMOL/L (ref 136–145)

## 2022-06-24 PROCEDURE — 3051F HG A1C>EQUAL 7.0%<8.0%: CPT | Performed by: FAMILY MEDICINE

## 2022-06-24 PROCEDURE — 86430 RHEUMATOID FACTOR TEST QUAL: CPT

## 2022-06-24 PROCEDURE — 86200 CCP ANTIBODY: CPT

## 2022-06-24 PROCEDURE — 82784 ASSAY IGA/IGD/IGG/IGM EACH: CPT

## 2022-06-24 PROCEDURE — 86140 C-REACTIVE PROTEIN: CPT

## 2022-06-24 PROCEDURE — 84165 PROTEIN E-PHORESIS SERUM: CPT | Performed by: PATHOLOGY

## 2022-06-24 PROCEDURE — 80053 COMPREHEN METABOLIC PANEL: CPT

## 2022-06-24 PROCEDURE — 86231 EMA EACH IG CLASS: CPT

## 2022-06-24 PROCEDURE — 86038 ANTINUCLEAR ANTIBODIES: CPT

## 2022-06-24 PROCEDURE — 83036 HEMOGLOBIN GLYCOSYLATED A1C: CPT

## 2022-06-24 PROCEDURE — 86364 TISS TRNSGLTMNASE EA IG CLAS: CPT

## 2022-06-24 PROCEDURE — 86225 DNA ANTIBODY NATIVE: CPT

## 2022-06-24 PROCEDURE — 85652 RBC SED RATE AUTOMATED: CPT

## 2022-06-24 PROCEDURE — 86235 NUCLEAR ANTIGEN ANTIBODY: CPT

## 2022-06-24 PROCEDURE — 36415 COLL VENOUS BLD VENIPUNCTURE: CPT

## 2022-06-24 PROCEDURE — 86258 DGP ANTIBODY EACH IG CLASS: CPT

## 2022-06-24 PROCEDURE — 84165 PROTEIN E-PHORESIS SERUM: CPT

## 2022-06-24 PROCEDURE — 86618 LYME DISEASE ANTIBODY: CPT

## 2022-06-24 NOTE — TELEPHONE ENCOUNTER
I spoke with patient regarding results of blood work, my discussion with [facility] Neurology regarding future appointment and recent evaluation by ENT  · Neurology will try to accommodate patient for sooner appointment  Dr Keren Boyd again kindly reviewed patient's records and recommended to change order for MRI with trigeminal neuralgia protocol with and without contrast  · ENT, Dr Henderson, evaluated patient on June 22nd  He is concerned about right parotiditis/Cialis denied is and recommends to proceed with MRI of parotid gland  · Patient is currently scheduled for brain MRI at [facility] on July 11th  Will contact [facility]iology in an effort to adjust/change testing as per specialist recommendations  · Patient's hemoglobin A1c has improved, currently at 7 4%  Fasting blood glucose is still elevated at 199  Patient has been working with the diet but has never started metformin  I strongly advised him to start medication as our goal for hemoglobin A1c is below 6 5  He is agreeable to start Rx now  · Mild elevation of sed rate and C-reactive protein  Rest of inflammatory markers panel is pending

## 2022-06-24 NOTE — TELEPHONE ENCOUNTER
Please contact [facility]'s Radiology/MRI Department  Patient is currently scheduled for MRI of brain on July 11th  I discussed his case with [facility] Neurology and they recommend to change it for MRI brain with and without contrast/trigeminal   I did place this new order in the system  Can this order simply replace previous MRI brain order without changing patient's time and date of appointment? Also patient was seen by ENT and they recommend MRI of parotid gland  I am not able to find order for MRI parotid gland in epic  I would like to request this to be added to patient's July 11th study  How should I placed this order?     Thank you

## 2022-06-25 LAB
B BURGDOR IGG+IGM SER-ACNC: 0.7 AI
DSDNA AB SER-ACNC: <1 IU/ML (ref 0–9)
ENA SCL70 AB SER-ACNC: <0.2 AI (ref 0–0.9)
ENA SS-A AB SER-ACNC: <0.2 AI (ref 0–0.9)
ENA SS-B AB SER-ACNC: <0.2 AI (ref 0–0.9)
RHEUMATOID FACT SER QL LA: NEGATIVE

## 2022-06-27 LAB
ALBUMIN SERPL ELPH-MCNC: 4.72 G/DL (ref 3.5–5)
ALBUMIN SERPL ELPH-MCNC: 58.3 % (ref 52–65)
ALPHA1 GLOB SERPL ELPH-MCNC: 0.33 G/DL (ref 0.1–0.4)
ALPHA1 GLOB SERPL ELPH-MCNC: 4.1 % (ref 2.5–5)
ALPHA2 GLOB SERPL ELPH-MCNC: 0.94 G/DL (ref 0.4–1.2)
ALPHA2 GLOB SERPL ELPH-MCNC: 11.6 % (ref 7–13)
BETA GLOB ABNORMAL SERPL ELPH-MCNC: 0.45 G/DL (ref 0.4–0.8)
BETA1 GLOB SERPL ELPH-MCNC: 5.6 % (ref 5–13)
BETA2 GLOB SERPL ELPH-MCNC: 5.4 % (ref 2–8)
BETA2+GAMMA GLOB SERPL ELPH-MCNC: 0.44 G/DL (ref 0.2–0.5)
ENDOMYSIUM IGA SER QL: NEGATIVE
GAMMA GLOB ABNORMAL SERPL ELPH-MCNC: 1.22 G/DL (ref 0.5–1.6)
GAMMA GLOB SERPL ELPH-MCNC: 15 % (ref 12–22)
GLIADIN PEPTIDE IGA SER-ACNC: 5 UNITS (ref 0–19)
GLIADIN PEPTIDE IGG SER-ACNC: 2 UNITS (ref 0–19)
IGA SERPL-MCNC: 283 MG/DL (ref 61–437)
IGG/ALB SER: 1.4 {RATIO} (ref 1.1–1.8)
PROT PATTERN SERPL ELPH-IMP: NORMAL
PROT SERPL-MCNC: 8.1 G/DL (ref 6.4–8.2)
RYE IGE QN: NEGATIVE
TTG IGA SER-ACNC: <2 U/ML (ref 0–3)
TTG IGG SER-ACNC: <2 U/ML (ref 0–5)

## 2022-06-27 NOTE — TELEPHONE ENCOUNTER
[address] [facility]: [phone], spoke w/ Abril Archer  They are able to keep apt date and time for MRI Brain w/ and w/o contrast     For Parotid MRI- please place order for MRI neck/soft tissue and in comment write Parotid  It needs to be a separate date and time, they can's do it together

## 2022-06-28 ENCOUNTER — TELEPHONE (OUTPATIENT)
Dept: FAMILY MEDICINE CLINIC | Facility: CLINIC | Age: 62
End: 2022-06-28

## 2022-06-28 LAB — CCP AB SER IA-ACNC: 2.2

## 2022-06-28 NOTE — TELEPHONE ENCOUNTER
I spoke with patient's wife last night  Patient is currently hospitalized at [facility]   Pain in the right parotid area has become quite severe over the weekend  Patient was evaluated in ED with CT neck, CT brain and subsequently had brain MRI  CT neck performed at [address] [place] [address] on June 26 revealed   "There appears to be a rounded mass in the region of the right parapharyngeal   space  Further evaluation MRI is recommended with contrast"    MRI neck performed at [facility] in June 26:  IMPRESSION: 2 7 cm rim-enhancing mass centered in the right   parapharyngeal/carotid space is indeterminate  Associated signal   abnormality/enhancement extending into the prevertebral space  Differential   considerations include a metastasis with associated edema, pathologic lymph node   with extracapsular spread of tumor, or a primary salivary gland neoplasm or   peripheral nerve sheath tumor with reactive changes  Abscess is considered   unlikely unless there is high clinical suspicion for infection        MRI brain performed in June 26:  1  Small foci of diffusion signal abnormality with associated susceptibility   artifact and enhancement in the right occipital lobe and left frontal lobe are   indeterminant  Differential considerations include metastases (including   melanoma) or subacute infarctions with blood products  2  A 9 mm curvilinear focus of intrinsic T1 hyperintense signal in the right   temporal lobe without associated mass effect, edema, or enhancement is   nonspecific  This finding could also reflect mineralization in the setting of   metastatic melanoma  Subacute blood could also have this appearance  Continued   follow-up is suggested  Patient's wife states that patient is more comfortable with pain medications administered in the hospital     KATIE Martinez, who so patient prior to hospitalization as outpatient will follow up with him closely during this hospital stay    I advised wife to contact me with any questions, concerns  We will cancel MRI that was scheduled at [facility] in early July  All blood work for connective tissue disease performed a few days ago was essentially unremarkable aside from very mild elevation of sed rate and CRP

## 2022-06-29 ENCOUNTER — TRANSITIONAL CARE MANAGEMENT (OUTPATIENT)
Dept: FAMILY MEDICINE CLINIC | Facility: CLINIC | Age: 62
End: 2022-06-29

## 2022-06-29 ENCOUNTER — VBI (OUTPATIENT)
Dept: ADMINISTRATIVE | Facility: OTHER | Age: 62
End: 2022-06-29

## 2022-07-01 ENCOUNTER — OFFICE VISIT (OUTPATIENT)
Dept: FAMILY MEDICINE CLINIC | Facility: CLINIC | Age: 62
End: 2022-07-01
Payer: COMMERCIAL

## 2022-07-01 VITALS
SYSTOLIC BLOOD PRESSURE: 116 MMHG | RESPIRATION RATE: 16 BRPM | HEIGHT: 66 IN | TEMPERATURE: 98.2 F | BODY MASS INDEX: 28.64 KG/M2 | HEART RATE: 75 BPM | OXYGEN SATURATION: 96 % | DIASTOLIC BLOOD PRESSURE: 80 MMHG | WEIGHT: 178.2 LBS

## 2022-07-01 DIAGNOSIS — R22.1 PARAPHARYNGEAL SPACE MASS: Primary | ICD-10-CM

## 2022-07-01 DIAGNOSIS — G50.0 FACIAL PAIN SYNDROME: ICD-10-CM

## 2022-07-01 DIAGNOSIS — R03.0 ELEVATED BLOOD PRESSURE, SITUATIONAL: ICD-10-CM

## 2022-07-01 DIAGNOSIS — R94.02 ABNORMAL BRAIN SCAN: ICD-10-CM

## 2022-07-01 PROCEDURE — 99496 TRANSJ CARE MGMT HIGH F2F 7D: CPT | Performed by: FAMILY MEDICINE

## 2022-07-01 RX ORDER — DICLOFENAC SODIUM 75 MG/1
TABLET, DELAYED RELEASE ORAL
Qty: 60 TABLET | Refills: 0 | Status: SHIPPED | OUTPATIENT
Start: 2022-07-01 | End: 2022-08-08 | Stop reason: SDUPTHER

## 2022-07-01 NOTE — PATIENT INSTRUCTIONS
Please start diclofenac (anti-inflammatory) 1 tablet twice a day after food as needed for pain, it is non drowsy and will not cause constipation  You may use Tylenol 1000 mg every 8 hours as needed  You may continue tramadol on a as needed basis, you can combine diclofenac, Tylenol and tramadol altogether if needed  Please restart gabapentin at night, take take 3 capsules every night  Try Metamucil twice a day and Colace as needed for constipation  If this regimen is ineffective MiraLax could be used as well  Blood pressure today was 116/80    Please continue monitoring at home, goal is to keep it below 140/80  Please call me once you see results of biopsy

## 2022-07-01 NOTE — PROGRESS NOTES
FAMILY PRACTICE OFFICE VISIT       NAME: Salome Bush  AGE: 60 y o  SEX: male       : 1960        MRN: [id number]        Assessment and Plan     1  Parapharyngeal space mass  Assessment & Plan:  - CT neck with parapharyngeal space mass  - MRI shows 2 7cm rim-enhancing mass centered in the right parapharyngeal/carotid space is indeterminate  Associated signal abnormality/enhancement extending into the prevertebral space  Differential considerations include metastasis with associated edema, pathologic lymph node with extracapsular spread of tumor or primary salivary gland neoplasm or peripheral nerve sheath tumor with reactive changes  Abscess less likely  Status post CT-guided biopsy  Pending follow-up with ENT  Patient likely will require consultation by Oncology, pending biopsy results  Continue pain management      2  Facial pain syndrome  -     diclofenac (VOLTAREN) 75 mg EC tablet; Take 1 tablet twice a day after food as needed for pain  -     gabapentin (NEURONTIN) 100 mg capsule; Take 3 capsules qhs    3  Abnormal brain scan  Assessment & Plan:  - CTH with right occipital lobe lesion concerning for primary vs metastatic neoplasm vs hemorrhage- Neuro saw in consult- felt to be neoplasm  - MRI brain indeterminate enhancement right occipital lobe and left frontal lobe  Pending biopsy results  Patient was evaluated by Oncology during recent hospitalization  Follow-up pending biopsy results  4  Elevated blood pressure, situational  Assessment & Plan:  Elevated blood pressure and sinus tachycardia upon arrival to emergency room  Blood pressure has normalized during hospitalization in has been normal at home  We all suspect pain and stress as likely reason  Patient has amlodipine 5 mg tablets at home  Blood pressure is normal on my recheck today  Continue monitoring  If BP is above 140/90-patient will start Rx  Patient's wife is a nurse and will continue monitoring closely  Patient Instructions   Please start diclofenac (anti-inflammatory) 1 tablet twice a day after food as needed for pain, it is non drowsy and will not cause constipation  You may use Tylenol 1000 mg every 8 hours as needed  You may continue tramadol on a as needed basis, you can combine diclofenac, Tylenol and tramadol altogether if needed  Please restart gabapentin at night, take take 3 capsules every night  Try Metamucil twice a day and Colace as needed for constipation  If this regimen is ineffective MiraLax could be used as well  Blood pressure today was 116/80  Please continue monitoring at home, goal is to keep it below 140/80  Please call me once you see results of biopsy        No follow-ups on file  Discussed with the patient and all questioned fully answered  He will call me if any problems arise  M*Modal software was used to dictate this note  It may contain errors with dictating incorrect words/spelling  Please contact provider directly with any questions  Chief Complaint     Chief Complaint   Patient presents with    Transition of Care Management     TCM Call: LVM: 06/26/22-06/29/22: Neck mass, Tachycardia, Brain lesion , Parapharyngeal space mass  (DNSA: 07/13/22)       History of Present Illness     TCM Call (since 5/31/2022)     Date and time call was made  6/29/2022  4:26 PM    Hospital care reviewed  Records reviewed    Patient was hospitialized at  [facility]    Date of Admission  06/26/22    Date of discharge  06/29/22    Diagnosis  Neck mass    Disposition  Home    Were the patients medications reviewed and updated  No    Current Symptoms  None      TCM Call (since 5/31/2022)     Post hospital issues  None    Should patient be enrolled in anticoag monitoring? No    Scheduled for follow up?   Yes    Did you obtain your prescribed medications  Yes    Do you need help managing your prescriptions or medications  No    Is transportation to your appointment needed  No I have advised the patient to call PCP with any new or worsening symptoms    Latrelle Mcardle, [address] or [employer] other    Support System  Spouse    Interperter language line needed  No    Counseling  Family    Counseling topics  Importance of RX compliance    Comments  Apt scheduledc for 7/1st       Follow up after recent hospitalization at [facility]   Patient presented with worsening of right-sided neck pain and right facial pain  Discharge summary reviewed today with patient and his wife who is accompanying him for this transfer of care appointment  Admitted on: 6/26/2022   Discharged on: 6/29/2022  Primary Care Physician: Cristofer Hardy -[phone]   Your Discharging Provider(s): Milady Escalera PA-C (Attending Izabel Trevizo MD)  Your Admission Symptom(s): mass  Your Main Diagnoses: Principal Problem:  Parapharyngeal space mass  Active Problems:  Right facial pain  Sinus tachycardia  Hyperglycemia  Hypertension  Abnormal CT of the head  Ptosis of right eyelid    Patient underwent CT guided BX of parapharyngeal space lesion  Reportedly he presented with elevated blood pressure and sinus tachycardia, it was felt related to pain and anxiety  Patient was prescribed amlodipine 5 mg daily for hypertension but was only advised to take medication if his blood pressure is elevated  Patient's wife is a nurse  They have been monitoring blood pressures at home and reportedly blood pressures at home are below 140, usually 138/80 range  Upon arrival to the hospital, patient's BP was 160/115  Patient is scheduled for follow-up with ENT on July 8th  He has no appointments with Oncology or Surgical Oncology at present time as he is awaiting for results of biopsy  Patient was evaluated by Oncology team of [facility] during recent hospitalization  He remains in pain    Inpatient team prescribed tramadol 25 mg   Medication helps somewhat but causes fatigue and significant constipation  Otherwise patient remains on regimen of PRN ibuprofen  and Tylenol  Discharge summary with highlights of diagnostic testing:    - CT neck with parapharyngeal space mass  - MRI shows 2 7cm rim-enhancing mass centered in the right parapharyngeal/carotid space is indeterminate  Associated signal abnormality/enhancement extending into the prevertebral space  Differential considerations include metastasis with associated edema, pathologic lymph node with extracapsular spread of tumor or primary salivary gland neoplasm or peripheral nerve sheath tumor with reactive changes  Abscess less likely  - CTH with right occipital lobe lesion concerning for primary vs metastatic neoplasm vs hemorrhage- Neuro saw in consult- felt to be neoplasm  - MRI brain indeterminate enhancement right occipital lobe and left frontal lobe          Review of Systems   Review of Systems   Constitutional: Negative  HENT: Negative  Eyes: Negative for pain and visual disturbance  Right eyelid ptosis   Cardiovascular: Negative  Gastrointestinal: Positive for constipation (due to Tramadol)  Negative for abdominal pain  Genitourinary: Negative  Musculoskeletal: Negative  Skin: Negative  Allergic/Immunologic: Negative  Neurological: Positive for headaches (right sided facial  and neck pain)  Hematological: Negative  Psychiatric/Behavioral: Negative          Active Problem List     Patient Active Problem List   Diagnosis    Disc degeneration, lumbar    Fatty liver    Diabetes mellitus type 2 in obese ([address] )    Traumatic tear of right rotator cuff    Tinnitus of left ear    Microalbuminuria due to type 2 diabetes mellitus (HCC)    Facial pain syndrome    Abnormal brain scan    Parapharyngeal space mass    Elevated blood pressure, situational       Past Medical History:  Past Medical History:   Diagnosis Date    Environmental allergies        Past Surgical History:  Past Surgical History:   Procedure Laterality Date    FL INJECTION RIGHT SHOULDER (ARTHROGRAM)  6/1/2021    HERNIA REPAIR      Last Assessed:  12/29/16       Family History:  Family History   Problem Relation Age of Onset   Mookie Mobile Breast cancer Mother     Other Mother         Laryngeal cancer    Colon cancer Family        Social History:  Social History     Socioeconomic History    Marital status: /Civil Union     Spouse name: Not on file    Number of children: Not on file    Years of education: Not on file    Highest education level: Not on file   Occupational History    Not on file   Tobacco Use    Smoking status: Never Smoker    Smokeless tobacco: Never Used   Vaping Use    Vaping Use: Never used   Substance and Sexual Activity    Alcohol use: Yes     Comment: social    Drug use: No    Sexual activity: Yes   Other Topics Concern    Not on file   Social History Narrative    Not on file     Social Determinants of Health     Financial Resource Strain: Not on file   Food Insecurity: Not on file   Transportation Needs: Not on file   Physical Activity: Not on file   Stress: Not on file   Social Connections: Not on file   Intimate Partner Violence: Not on file   Housing Stability: Not on file         Objective     Vitals:    07/01/22 1059 07/01/22 1132   BP: 150/82 116/80   BP Location: Left arm    Patient Position: Sitting    Cuff Size: Standard    Pulse: 75    Resp: 16    Temp: 98 2 °F (36 8 °C)    TempSrc: Temporal    SpO2: 96%    Weight: 80 8 kg (178 lb 3 2 oz)    Height: 5' 6" (1 676 m)        Wt Readings from Last 3 Encounters:   07/01/22 80 8 kg (178 lb 3 2 oz)   06/07/22 83 5 kg (184 lb)   04/12/22 85 7 kg (189 lb)       Physical Exam  Vitals and nursing note reviewed  Constitutional:       General: He is not in acute distress  Appearance: Normal appearance  He is well-developed  He is not ill-appearing  HENT:      Head: Normocephalic and atraumatic     Eyes:      General: No scleral icterus  Conjunctiva/sclera: Conjunctivae normal       Comments: Mild right eyelid ptosis   Neck:      Vascular: No carotid bruit  Cardiovascular:      Rate and Rhythm: Normal rate and regular rhythm  Heart sounds: Normal heart sounds  No murmur heard  Pulmonary:      Effort: Pulmonary effort is normal  No respiratory distress  Breath sounds: Normal breath sounds  No wheezing or rales  Abdominal:      General: Bowel sounds are normal  There is no distension or abdominal bruit  Palpations: Abdomen is soft  Musculoskeletal:         General: Normal range of motion  Cervical back: Neck supple  No rigidity  Right lower leg: No edema  Left lower leg: No edema  Skin:     General: Skin is warm  Neurological:      General: No focal deficit present  Mental Status: He is alert and oriented to person, place, and time  Cranial Nerves: No cranial nerve deficit  Psychiatric:         Mood and Affect: Mood normal          Behavior: Behavior normal          Thought Content:  Thought content normal           Pertinent Laboratory/Diagnostic Studies:    Lab Results   Component Value Date    WBC 6 48 03/30/2022    HGB 17 3 (H) 03/30/2022    HCT 48 0 03/30/2022    MCV 90 03/30/2022     03/30/2022       No results found for: TSH    Lab Results   Component Value Date    CHOL 209 (H) 01/11/2017     Lab Results   Component Value Date    TRIG 122 03/30/2022     Lab Results   Component Value Date    HDL 52 03/30/2022     Lab Results   Component Value Date    LDLCALC 120 (H) 03/30/2022     Lab Results   Component Value Date    HGBA1C 7 4 (H) 06/24/2022     Lab Results   Component Value Date    SODIUM 138 06/24/2022    K 4 1 06/24/2022     06/24/2022    CO2 29 06/24/2022    AGAP 2 (L) 06/24/2022    BUN 15 06/24/2022    CREATININE 0 89 06/24/2022    GLUC 129 06/19/2019    GLUF 199 (H) 06/24/2022    CALCIUM 9 3 06/24/2022    AST 23 06/24/2022    ALT 42 06/24/2022    ALKPHOS 99 06/24/2022    PROT 7 1 01/11/2017    TP 8 4 (H) 06/24/2022    TP 8 1 06/24/2022    BILITOT 0 6 01/11/2017    TBILI 0 56 06/24/2022    EGFR 90 06/24/2022       No orders of the defined types were placed in this encounter  ALLERGIES:  Allergies   Allergen Reactions    Morphine Nausea Only and Headache     Intolerance       Current Medications     Current Outpatient Medications   Medication Sig Dispense Refill    Blood Glucose Monitoring Suppl (OneTouch Verio Reflect) w/Device KIT Check blood sugars once daily  Please substitute with appropriate alternative as covered by patient's insurance  Dx: E11 65 1 kit 0    diclofenac (VOLTAREN) 75 mg EC tablet Take 1 tablet twice a day after food as needed for pain 60 tablet 0    gabapentin (NEURONTIN) 100 mg capsule Take 3 capsules qhs 90 capsule 1    glucose blood (OneTouch Verio) test strip Check blood sugars once daily  Please substitute with appropriate alternative as covered by patient's insurance  Dx: E11 65 100 each 3    OneTouch Delica Lancets 11M MISC Check blood sugars once daily  Please substitute with appropriate alternative as covered by patient's insurance  Dx: E11 65 100 each 3    traMADol (ULTRAM) 50 mg tablet Take 25 mg by mouth every 6 (six) hours as needed      amLODIPine (NORVASC) 5 mg tablet Take 5 mg by mouth daily (Patient not taking: Reported on 7/1/2022)      Fluticasone Propionate (Xhance) 93 MCG/ACT EXHU 2 sprays into each nostril in the morning 16 mL 2    metFORMIN (GLUCOPHAGE-XR) 500 mg 24 hr tablet Take 1 tablet once a day with dinner for 7 days, then increase to  2 tablets once a day at dinner time (Patient not taking: Reported on 7/1/2022) 60 tablet 3     No current facility-administered medications for this visit         Medications Discontinued During This Encounter   Medication Reason    gabapentin (NEURONTIN) 100 mg capsule        Health Maintenance     Health Maintenance   Topic Date Due    Hepatitis C Screening  Never done  Pneumococcal Vaccine: Pediatrics (0 to 5 Years) and At-Risk Patients (6 to 59 Years) (1 - PCV) Never done    DM Eye Exam  Never done    HIV Screening  Never done    Annual Physical  03/22/2022    COVID-19 Vaccine (3 - Booster for Karsten series) 04/03/2022    Influenza Vaccine (1) 09/01/2022    HEMOGLOBIN A1C  12/24/2022    URINE MICROALBUMIN  03/30/2023    BMI: Followup Plan  04/16/2023    Diabetic Foot Exam  06/13/2023    Depression Screening  07/01/2023    BMI: Adult  07/01/2023    Colorectal Cancer Screening  04/20/2026    DTaP,Tdap,and Td Vaccines (2 - Td or Tdap) 09/02/2031    HIB Vaccine  Aged Out    Hepatitis B Vaccine  Aged Out    IPV Vaccine  Aged Out    Hepatitis A Vaccine  Aged Out    Meningococcal ACWY Vaccine  Aged Out    HPV Vaccine  Aged Out       Immunization History   Administered Date(s) Administered    COVID-19 J&J (Karsten) vaccine 0 5 mL 05/07/2021, 12/03/2021    Tdap 09/02/2021       Jessica Ibarra MD

## 2022-07-05 ENCOUNTER — TELEPHONE (OUTPATIENT)
Dept: FAMILY MEDICINE CLINIC | Facility: CLINIC | Age: 62
End: 2022-07-05

## 2022-07-05 NOTE — TELEPHONE ENCOUNTER
----- Message from Kem Segovia sent at 7/1/2022  4:35 PM EDT -----  Regarding: FW: Neck biopsy     ----- Message -----  From: Marylou Rodrigues  Sent: 7/1/2022   4:12 PM EDT  To: Haseeb Mix [facility] Clinical  Subject: Neck biopsy                                      Hi Dr Sha Romero,  There seems to be some results from the biopsy  I think they are saying : This staining pattern is suggestive of a malignant melanoma     Theoplis Carrier

## 2022-07-06 NOTE — TELEPHONE ENCOUNTER
I spoke with patient last night  I am not able to view preliminary biopsy report in Care everywhere  Patient will keep his appointment with ENT on Friday  I do recommend to proceed with Hematology Oncology and Surgical Oncology consultation as well  Patient and his wife will discuss where they would like to pursue further evaluation and treatment and will get back to me  Patient reports interim improvement of pain with start of diclofenac

## 2022-07-07 PROBLEM — R22.1 PARAPHARYNGEAL SPACE MASS: Status: ACTIVE | Noted: 2022-07-07

## 2022-07-07 PROBLEM — R03.0 ELEVATED BLOOD PRESSURE, SITUATIONAL: Status: ACTIVE | Noted: 2022-07-07

## 2022-07-07 PROBLEM — R94.02 ABNORMAL BRAIN SCAN: Chronic | Status: ACTIVE | Noted: 2022-07-07

## 2022-07-07 PROBLEM — R94.02 ABNORMAL BRAIN SCAN: Status: ACTIVE | Noted: 2022-07-07

## 2022-07-07 PROBLEM — K11.20 PAROTIDITIS: Status: RESOLVED | Noted: 2022-04-12 | Resolved: 2022-07-07

## 2022-07-07 RX ORDER — GABAPENTIN 100 MG/1
CAPSULE ORAL
Qty: 90 CAPSULE | Refills: 1
Start: 2022-07-07 | End: 2022-09-22

## 2022-07-07 NOTE — ASSESSMENT & PLAN NOTE
Elevated blood pressure and sinus tachycardia upon arrival to emergency room  Blood pressure has normalized during hospitalization in has been normal at home  We all suspect pain and stress as likely reason  Patient has amlodipine 5 mg tablets at home  Blood pressure is normal on my recheck today  Continue monitoring  If BP is above 140/90-patient will start Rx  Patient's wife is a nurse and will continue monitoring closely

## 2022-07-07 NOTE — ASSESSMENT & PLAN NOTE
- CT neck with parapharyngeal space mass  - MRI shows 2 7cm rim-enhancing mass centered in the right parapharyngeal/carotid space is indeterminate  Associated signal abnormality/enhancement extending into the prevertebral space  Differential considerations include metastasis with associated edema, pathologic lymph node with extracapsular spread of tumor or primary salivary gland neoplasm or peripheral nerve sheath tumor with reactive changes  Abscess less likely  Status post CT-guided biopsy  Pending follow-up with ENT  Patient likely will require consultation by Oncology, pending biopsy results    Continue pain management

## 2022-07-07 NOTE — ASSESSMENT & PLAN NOTE
Lab Results   Component Value Date    HGBA1C 7 4 (H) 06/24/2022     Improved glycemic control with diet and lifestyle changes    Patient was advised to restart metformin

## 2022-07-07 NOTE — ASSESSMENT & PLAN NOTE
- CTH with right occipital lobe lesion concerning for primary vs metastatic neoplasm vs hemorrhage- Neuro saw in consult- felt to be neoplasm  - MRI brain indeterminate enhancement right occipital lobe and left frontal lobe  Pending biopsy results  Patient was evaluated by Oncology during recent hospitalization  Follow-up pending biopsy results

## 2022-07-09 ENCOUNTER — PATIENT MESSAGE (OUTPATIENT)
Dept: FAMILY MEDICINE CLINIC | Facility: CLINIC | Age: 62
End: 2022-07-09

## 2022-08-08 DIAGNOSIS — G50.0 FACIAL PAIN SYNDROME: ICD-10-CM

## 2022-08-08 RX ORDER — DICLOFENAC SODIUM 75 MG/1
TABLET, DELAYED RELEASE ORAL
Qty: 60 TABLET | Refills: 1 | Status: SHIPPED | OUTPATIENT
Start: 2022-08-08 | End: 2022-09-22

## 2022-08-19 ENCOUNTER — VBI (OUTPATIENT)
Dept: ADMINISTRATIVE | Facility: OTHER | Age: 62
End: 2022-08-19

## 2022-09-16 ENCOUNTER — TRANSITIONAL CARE MANAGEMENT (OUTPATIENT)
Dept: FAMILY MEDICINE CLINIC | Facility: CLINIC | Age: 62
End: 2022-09-16

## 2022-09-19 ENCOUNTER — TELEPHONE (OUTPATIENT)
Dept: FAMILY MEDICINE CLINIC | Facility: CLINIC | Age: 62
End: 2022-09-19

## 2022-09-19 NOTE — TELEPHONE ENCOUNTER
[place]ia's daughter called in regarding her father's health, his cancer diagnosis and care  Would like to know if you can return her call at [phone]

## 2022-09-20 ENCOUNTER — TELEPHONE (OUTPATIENT)
Dept: NEUROLOGY | Facility: CLINIC | Age: 62
End: 2022-09-20

## 2022-09-20 ENCOUNTER — TELEPHONE (OUTPATIENT)
Dept: FAMILY MEDICINE CLINIC | Facility: CLINIC | Age: 62
End: 2022-09-20

## 2022-09-20 DIAGNOSIS — E11.69 DIABETES MELLITUS TYPE 2 IN OBESE (HCC): Primary | ICD-10-CM

## 2022-09-20 DIAGNOSIS — E66.9 DIABETES MELLITUS TYPE 2 IN OBESE (HCC): Primary | ICD-10-CM

## 2022-09-20 RX ORDER — GLIPIZIDE 5 MG/1
5 TABLET, FILM COATED, EXTENDED RELEASE ORAL DAILY
Qty: 30 TABLET | Refills: 5 | Status: SHIPPED | OUTPATIENT
Start: 2022-09-20

## 2022-09-20 RX ORDER — INSULIN LISPRO 100 [IU]/ML
INJECTION, SOLUTION INTRAVENOUS; SUBCUTANEOUS
Qty: 15 ML | Refills: 3 | Status: SHIPPED | OUTPATIENT
Start: 2022-09-20

## 2022-09-20 NOTE — TELEPHONE ENCOUNTER
Spoke with patient in regards to the cancellation of his appt  with Dr Suzi Sue  Patient stated he did not need to reschedule appt  That it Is no longer needed

## 2022-09-20 NOTE — TELEPHONE ENCOUNTER
----- Message from Kem Maguire sent at 9/20/2022  7:36 AM EDT -----  Regarding: FW:   appointment     ----- Message -----  From: Juan F Chambers  Sent: 9/20/2022   7:03 AM EDT  To: Ugo Purvis [facility] Clinical  Subject: Dr concepcion                                  Good Morning,  Bruna Rinne will not be able to make his appointment on 9/22  His WBCs are 3 8 and has been running fevers  We messaged earlier about his BS and where told to increase his metformin to 2 tablets twice a day  He is still taking  prednisone 90 mg daily  His BS have been running in the 200s  9/  9/  9/  Let us know how to proceed    TY  Mabel Miranda

## 2022-09-20 NOTE — TELEPHONE ENCOUNTER
I spoke with patient's wife and reviewed my chart message  Patient is currently on 90 mg of prednisone daily  Blood sugars are elevated  Blood work performed yesterday revealed blood glucose of 395 with sodium 124  [facility] Oncology team is aware  Patient is currently at the infusion center received IV fluids and blood work was recheck  Patient's wife reached out to our office on September 10th regarding elevated blood sugars and dose of metformin was elevated from 500 mg b i d  to 1000 mg b i d     Patient is currently treated for C diff colitis and is completing course of Cipro and Flagyl  He has been experiencing from 4 to 6 loose bowel movements daily within past few weeks but today he had only 2 loose bowel movements so far  Oncology team with starting him on Protonix  His appetite is decreased        Plan:  · Await for repeat blood work results, monitor sodium closely, I suspect that hyponatremia is likely partially artificial due to hypoglycemia   · Reduce dose of metformin to 500 mg b i d   · Start glipizide ER 5 mg daily  · Start regular insulin sliding scale  · Check blood sugars 3 times a day  · Patient will keep his follow-up on September 22nd, virtual or in-person

## 2022-09-21 RX ORDER — PREDNISONE 10 MG/1
TABLET ORAL
COMMUNITY
Start: 2022-09-15

## 2022-09-21 RX ORDER — CIPROFLOXACIN 500 MG/1
TABLET, FILM COATED ORAL
COMMUNITY
Start: 2022-09-07 | End: 2022-10-01 | Stop reason: ALTCHOICE

## 2022-09-21 RX ORDER — PANTOPRAZOLE SODIUM 40 MG/1
40 TABLET, DELAYED RELEASE ORAL DAILY
COMMUNITY
Start: 2022-09-19 | End: 2022-09-22

## 2022-09-21 RX ORDER — PREDNISONE 50 MG/1
TABLET ORAL
COMMUNITY
Start: 2022-09-07

## 2022-09-21 RX ORDER — VANCOMYCIN HYDROCHLORIDE 500 MG/10ML
INJECTION, POWDER, LYOPHILIZED, FOR SOLUTION INTRAVENOUS
COMMUNITY
Start: 2022-08-29 | End: 2022-09-22

## 2022-09-21 RX ORDER — ONDANSETRON HYDROCHLORIDE 8 MG/1
8 TABLET, FILM COATED ORAL EVERY 8 HOURS PRN
COMMUNITY
Start: 2022-09-19 | End: 2022-09-26

## 2022-09-21 RX ORDER — SULFAMETHOXAZOLE AND TRIMETHOPRIM 800; 160 MG/1; MG/1
TABLET ORAL
COMMUNITY
Start: 2022-09-07

## 2022-09-21 RX ORDER — METRONIDAZOLE 500 MG/1
TABLET ORAL
COMMUNITY
Start: 2022-09-07 | End: 2022-10-01 | Stop reason: ALTCHOICE

## 2022-09-21 RX ORDER — PROCHLORPERAZINE MALEATE 10 MG
TABLET ORAL
COMMUNITY
Start: 2022-09-17

## 2022-09-21 RX ORDER — PREDNISONE 20 MG/1
TABLET ORAL
COMMUNITY
Start: 2022-08-26

## 2022-09-21 RX ORDER — PROCHLORPERAZINE MALEATE 10 MG
10 TABLET ORAL EVERY 6 HOURS PRN
COMMUNITY
Start: 2022-09-20

## 2022-09-22 ENCOUNTER — TELEMEDICINE (OUTPATIENT)
Dept: FAMILY MEDICINE CLINIC | Facility: CLINIC | Age: 62
End: 2022-09-22
Payer: COMMERCIAL

## 2022-09-22 DIAGNOSIS — K52.9 COLITIS: Primary | ICD-10-CM

## 2022-09-22 DIAGNOSIS — K21.9 CHRONIC GERD: ICD-10-CM

## 2022-09-22 DIAGNOSIS — E86.0 DEHYDRATION: ICD-10-CM

## 2022-09-22 DIAGNOSIS — E66.9 DIABETES MELLITUS TYPE 2 IN OBESE (HCC): Chronic | ICD-10-CM

## 2022-09-22 DIAGNOSIS — C79.31 BRAIN METASTASES (HCC): ICD-10-CM

## 2022-09-22 DIAGNOSIS — C43.9 MALIGNANT MELANOMA, UNSPECIFIED SITE (HCC): ICD-10-CM

## 2022-09-22 DIAGNOSIS — E11.69 DIABETES MELLITUS TYPE 2 IN OBESE (HCC): Chronic | ICD-10-CM

## 2022-09-22 PROCEDURE — 99496 TRANSJ CARE MGMT HIGH F2F 7D: CPT | Performed by: FAMILY MEDICINE

## 2022-09-22 RX ORDER — SYRINGE-NEEDLE,INSULIN,0.5 ML 28 GAUGE
SYRINGE, EMPTY DISPOSABLE MISCELLANEOUS
COMMUNITY
Start: 2022-09-20

## 2022-09-22 RX ORDER — FAMOTIDINE 40 MG/1
40 TABLET, FILM COATED ORAL DAILY
Qty: 30 TABLET | Refills: 5 | Status: SHIPPED | OUTPATIENT
Start: 2022-09-22

## 2022-09-22 NOTE — PROGRESS NOTES
Virtual TCM Visit:    Verification of patient location:    Patient is located in the following state in which I hold an active license [place]        Assessment/Plan:        Problem List Items Addressed This Visit        Digestive    Colitis - Primary     Colitis with rectal bleeding due to immunotherapy for metastatic melanoma     Recent hospitalization due to dehydration and persistence of symptoms  Patient reportedly is colonized with C diff, no further treatment warranted as per inpatient/Oncology teams  Patient just completed course of Flagyl/Cipro  Reportedly he developed significant side effects to vancomycin  Patient is treated with high doses of corticosteroids, current dose of prednisone is 90 mg daily         Chronic GERD     Occasional dry cough after meals, possibly GERD related  No choking episodes  Discontinue Protonix due to ongoing diarrhea  Switch to Pepcid 40 mg daily  If postprandial cough persist-patient will likely need evaluation with speech therapy to rule out possible aspiration         Relevant Medications    famotidine (PEPCID) 40 MG tablet       Endocrine    Diabetes mellitus type 2 in obese (HCC) (Chronic)       Lab Results   Component Value Date    HGBA1C 7 5 (H) 09/06/2022     Worsening of hyperglycemia due to current corticosteroid therapy for metastatic malignant melanoma  Blood glucose has improved as of yesterday, 325  Persistent hyponatremia, sodium level has improved from 124 to 126 within past 2 days  Patient is scheduled for blood work and IV hydration on September 23rd at [address]  Oncology believes that hyponatremia is likely related due to hyperglycemia  Patient just started on regular insulin sliding scale with good results  Dose of metformin was reduced due to diarrhea, I advised wife to reduce it furtherI and keep metformin at 500 mg daily  Patient just started glipizide ER 5 mg daily  Patient's wife will update me with his blood sugar readings in 4 to 5 days  Relevant Medications    metFORMIN (GLUCOPHAGE) 500 mg tablet       Nervous and Auditory    Brain metastases (HCC)     Recent gamma knife procedure  Oncology follows  Relevant Orders    Ambulatory Referral to Palliative Care       Other    Malignant melanoma [facility]) (Chronic)     [facility] Hematology Oncology follows  Immunotherapy  Patient is DNR  Patient and wife are requesting referral to [facility] to be filled out         Relevant Orders    Ambulatory Referral to Palliative Care    Dehydration     Dehydration and hyponatremia, likely due to hyperglycemia  Patient has been followed closely by Oncology and has received IV infusions within past week  Blood work is being monitored by treating oncologist                  Reason for visit is TCM    Encounter provider Chicho Sood MD       Provider located at [address] [place] [address]  [address] [place] [id number]      Recent Visits  Date Type Provider Dept   09/22/22 Telemedicine Chicho Sood  [facility]   09/20/22 Telephone Chicho Sood MD [address] [place] [address]   09/19/22 Telephone Chicho Sood MD [facility] recent visits within past 7 days and meeting all other requirements  Future Appointments  No visits were found meeting these conditions  Showing future appointments within next 150 days and meeting all other requirements       After connecting through [employer], the patient was identified by name and date of birth  Salome Bush was informed that this is a telemedicine visit and that the visit is being conducted through     My office door was closed  No one else was in the room  He acknowledged consent and understanding of privacy and security of the video platform  The patient has agreed to participate and understands they can discontinue the visit at any time      Patient is aware this is a billable service  Subjective:     Patient ID: Cony Truong is a 60 y o  male  Virtual follow-up after recent hospitalization at [facility] due to colitis with rectal bleeding  Patient has been feeling extremely fatigued  He is undergoing chemotherapy and immunotherapy for recent diagnosis of metastatic melanoma of unknown origin  Patient is under care of [facility]  Video visit today, patient is in a chair, accompanied by his wife  Discharge summary from [facility] reviewed  Discharge Date: 9/15/2022    Admission Diagnoses   Colitis with rectal bleeding (K52 9, K62 5)    Discharge Diagnoses  Principal Problem:  Colitis with rectal bleeding  Active Problems:  Malignant melanoma, unspecified site [facility])  Brain metastases ([address] )  Dehydration  Colitis    As per [facility] in patient  D/c summary  "Patient recently admitted due to 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 treatment  Patient completing steroids outpatient but continues to have worsening fatigue, weight loss and, and poor p o  intake  He was referred to the ED by heme-onc and request admission to hospital medicine  Oncology recommends 80 mg of Solu-Medrol BLD which was initiated in the emergency department as well as QuantiFERON gold and routine consult inpatient  Patient was recently hospitalized from 9/2-9/7/2022 after presenting with bloody diarrhea x 1 week  Stool culture at that time was positive for C diff  he had PO Vanco x 3 days which was discontinued due to low suspicious of active c-diff and testing was consistent with colonization  Patient is on immunotherapy for Metastatic melanoma  Denies abdominal pain, N/V, fever or syncopal event  Patient was placed on IV solumedrol 80mg BID in addition to oral cipro and flagyl  Hem/onc, ID consulted  Patient did not need antibiotic therapy and was taken off  He has c diff colonization not infection  He was started on vedolizumab  Patient had no further gi bleeding and was switched to oral prendisone on 9/15/22  The patient was found stable for discharge home on prednisone 90mg daily for one week and will then follow up with hematology for further recommendations "    Patient is feeling poorly  He is very fatigued  Appetite is decreased  Yesterday's diet consisted of soup 3 times a day  4 to 6 loose bowel movements daily, 1st 2 bowel movements of the day as still bloody  Patient is completing Cipro and Flagyl today  Previous positive testing for C diff, felt to be colonization related  Patient reports that he had significant side effects due to vancomycin  Patient remains on Bactrim on Mondays, Wednesdays and Fridays as per regular schedule by Oncology  He was evaluated by Gastroenterology team while inpatient  No current follow-up  He is scheduled to see Dr Anders Wray, oncologist on Monday, September 26  Patient remains on Protonix for GERD symptoms  No symptoms of choking but wife has noticed that occasionally he has dry cough after food  Patient is under care of Dermatology  No primary site of melanoma was ever detected  Patient was diagnosed with squamous cell carcinoma, removal is deferred pending his overall poor health status  I spoke with patient's wife 2 days ago  He was receiving IV fluids at [facility] infusion center  As of September 20th his blood glucose was 395 and sodium 124, reviewed repeat blood work from yesterday and sodium had gone up from 124 to 126 and blood glucose has improved from 395 to 325  I have make changes in patient's regimen for type 2 diabetes and reduce dose of metformin from 1000 mg twice a day due to recurrent symptoms of diarrhea  Patient started on regular insulin sliding scale 3 times per day  Patient's wife is a nurse and is managing this regimen well for now  Patient also started glipizide ER 5 mg daily  Patient's wife is a primary caretaker    Patient and wife I stating that he is DNR  They would like POLST form to be filled out and would like to start care with [facility] palliative Medicine               Review of Systems   Constitutional: Positive for activity change, appetite change and fatigue  Negative for fever  HENT: Negative  Respiratory: Negative  Cardiovascular: Negative  Gastrointestinal: Positive for blood in stool and diarrhea  Endocrine: Negative  Genitourinary: Negative  Musculoskeletal: Positive for arthralgias and myalgias  Skin: Negative  Neurological: Negative  Psychiatric/Behavioral: Positive for dysphoric mood  Objective:    Vitals:       Physical Exam  Nursing note reviewed  Constitutional:       General: He is not in acute distress  Appearance: Normal appearance  He is ill-appearing (chronically ill, fatigued, in a chair)  Pulmonary:      Effort: Pulmonary effort is normal  No respiratory distress  Neurological:      General: No focal deficit present  Mental Status: He is alert and oriented to person, place, and time  Psychiatric:         Behavior: Behavior normal          Thought Content: Thought content normal              Transitional Care Management Review:  Aldo Alfaro is a 60 y o  male here for TCM follow up  During the TCM phone call patient stated:    TCM Call     Date and time call was made  9/16/2022  2:22 PM    Hospital care reviewed  Records reviewed    Patient was hospitialized at  [facility]    Date of Admission  09/12/22    Date of discharge  09/15/22    Diagnosis  Colitis w/ rectal bleeding (DNSA: 09/29/22)    Disposition  Home    Were the patients medications reviewed and updated  No    Current Symptoms  None      TCM Call     Post hospital issues  None    Should patient be enrolled in anticoag monitoring? No    Scheduled for follow up?   Yes    Did you obtain your prescribed medications  Yes    Do you need help managing your prescriptions or medications  No    Is transportation to your appointment needed  No    I have advised the patient to call PCP with any new or worsening symptoms  Latrelle Mcardle, [address] or Signific[employer] other    Support System  Spouse    Are you recieving any outpatient services  Yes    What type of services  Chemo therapy    Interperter language line needed  No    Counseling  Patient    Counseling topics  Importance of RX compliance    Comments  Apt scheduled for 9/22nd at 10am           I spent 30 minutes with the patient during this visit  Cristofer Hardy MD      [facility]IT [facility]  verbally agrees to participate in [employer]  Pt is aware that [employer] could be limited without vital signs or the ability to perform a full hands-on physical exam  Cecilio Sam understands he or the provider may request at any time to terminate the video visit and request the patient to seek care or treatment in person

## 2022-09-22 NOTE — ASSESSMENT & PLAN NOTE
[facility] Hematology Oncology follows  Immunotherapy  Patient is DNR  Patient and wife are requesting referral to [address] [place], [place] [address] to be filled out

## 2022-09-22 NOTE — ASSESSMENT & PLAN NOTE
Lab Results   Component Value Date    HGBA1C 7 5 (H) 09/06/2022     Worsening of hyperglycemia due to current corticosteroid therapy for metastatic malignant melanoma  Blood glucose has improved as of yesterday, 325  Persistent hyponatremia, sodium level has improved from 124 to 126 within past 2 days  Patient is scheduled for blood work and IV hydration on September 23rd at [facility]  Oncology believes that hyponatremia is likely related due to hyperglycemia  Patient just started on regular insulin sliding scale with good results  Dose of metformin was reduced due to diarrhea, I advised wife to reduce it furtherI and keep metformin at 500 mg daily  Patient just started glipizide ER 5 mg daily  Patient's wife will update me with his blood sugar readings in 4 to 5 days

## 2022-09-22 NOTE — Clinical Note
Good morning, Patient was recently diagnosed with metastatic melanoma  He is undergoing treatment with [facility] but patient wife firmly prefer to set of palliative care through [facility]  Recent hospitalization due to colitis and dehydration likely due to immunotherapy  Patient's wife is a registered nurse and is currently his primary caretaker  I would appreciate if you could take over this case and help them in any way matter  I did place a referral to [address] [place] [address] yesterday  Wife is also interested in filling out POLST form   Thank you

## 2022-09-23 PROBLEM — E86.0 DEHYDRATION: Status: ACTIVE | Noted: 2022-09-23

## 2022-09-23 PROBLEM — R94.02 ABNORMAL BRAIN SCAN: Chronic | Status: RESOLVED | Noted: 2022-07-07 | Resolved: 2022-09-23

## 2022-09-23 PROBLEM — K21.9 CHRONIC GERD: Status: ACTIVE | Noted: 2022-09-23

## 2022-09-23 NOTE — ASSESSMENT & PLAN NOTE
Occasional dry cough after meals, possibly GERD related  No choking episodes  Discontinue Protonix due to ongoing diarrhea  Switch to Pepcid 40 mg daily    If postprandial cough persist-patient will likely need evaluation with speech therapy to rule out possible aspiration

## 2022-09-23 NOTE — ASSESSMENT & PLAN NOTE
· Dehydration and hyponatremia, likely due to hyperglycemia  · Patient has been followed closely by Oncology and has received IV infusions within past week    · Blood work is being monitored by treating oncologist

## 2022-09-23 NOTE — ASSESSMENT & PLAN NOTE
Colitis with rectal bleeding due to immunotherapy for metastatic melanoma     Recent hospitalization due to dehydration and persistence of symptoms  Patient reportedly is colonized with C diff, no further treatment warranted as per inpatient/Oncology teams  Patient just completed course of Flagyl/Cipro  Reportedly he developed significant side effects to vancomycin    Patient is treated with high doses of corticosteroids, current dose of prednisone is 90 mg daily

## 2022-09-28 ENCOUNTER — CONSULT (OUTPATIENT)
Dept: PALLIATIVE MEDICINE | Facility: CLINIC | Age: 62
End: 2022-09-28
Payer: COMMERCIAL

## 2022-09-28 VITALS — OXYGEN SATURATION: 97 % | TEMPERATURE: 97.3 F | BODY MASS INDEX: 23.81 KG/M2 | HEART RATE: 85 BPM | WEIGHT: 147.49 LBS

## 2022-09-28 DIAGNOSIS — C79.31 BRAIN METASTASES (HCC): ICD-10-CM

## 2022-09-28 DIAGNOSIS — C43.9 MALIGNANT MELANOMA, UNSPECIFIED SITE (HCC): ICD-10-CM

## 2022-09-28 DIAGNOSIS — R63.0 POOR APPETITE: ICD-10-CM

## 2022-09-28 DIAGNOSIS — K52.9 COLITIS: Primary | ICD-10-CM

## 2022-09-28 DIAGNOSIS — Z71.89 GOALS OF CARE, COUNSELING/DISCUSSION: ICD-10-CM

## 2022-09-28 DIAGNOSIS — R53.81 DEBILITY: ICD-10-CM

## 2022-09-28 PROCEDURE — 99245 OFF/OP CONSLTJ NEW/EST HI 55: CPT | Performed by: INTERNAL MEDICINE

## 2022-09-28 RX ORDER — DRONABINOL 2.5 MG/1
2.5 CAPSULE ORAL
Qty: 30 CAPSULE | Refills: 0 | Status: SHIPPED | OUTPATIENT
Start: 2022-09-28 | End: 2022-10-11

## 2022-09-28 NOTE — PROGRESS NOTES
Palliative and Supportive Care   Terrie Light 60 y o  male [id number]    Assessment/Plan:  1  Colitis    2  Malignant melanoma, unspecified site ([facility] )    3  Brain metastases ([facility] )    4  Poor appetite    5  Debility    6  Goals of care, counseling/discussion      Provided patient and spouse with resources  Reach out to [facility] LSW team and will have them contact Manda Eli and his spouse as well as be present for follow up  PT referral placed now that patient is recovering from his colitis  Will trial marinol 2 5mg BID but patient expresses interest in [address]  Will certify at follow up if he requests  Appreciate input of nutritionist   For back pain patient will continue to manage with tylenol, topicals  Regarding goals - currently treatment focused and has POA document written with attached living will  Close follow up in 2 weeks  Requested Prescriptions     Signed Prescriptions Disp Refills    dronabinol (MARINOL) 2 5 mg capsule 30 capsule 0     Sig: Take 1 capsule (2 5 mg total) by mouth 2 (two) times a day before meals     There are no discontinued medications  Representatives have queried the patient's controlled substance dispensing history in the Prescription Drug Monitoring Program in compliance with regulations before I have prescribed any controlled substances  The prescription history is consistent with prescribed therapy and our practice policies  45 minutes were spent face to face with Terrie Light and his spouse with greater than 50% of the time spent in counseling or coordination of care including discussions of treatment instructions   All of the patient's questions were answered during this discussion  No follow-ups on file      Subjective:   Chief Complaint  New consultation for:  symptom management, goal of care assessment and decisional support  HPI     Terrie Light is a 60 y o  male with metastatic melenoma currently on immunotherapy, which is on hold due to recent bout of colitis for which he was hospitalized  Patient has since been on a steroid taper but during this time period has had pretty significant symptomatology including poor appetite, debility, and substantial weight loss  Although he is slowly improving [facility] consulted to assist with symptom mgmt and optimization  Regarding appetite, patient feels it is a matter of lack of taste  Denies pain or difficulty swallowing  Wife feels he might be occasionally aspirating but imaging has been relatively reassuring  He also feels he is getting stronger with his eating  He does drink 3-5 bottles of water per day and is going for IV hydration 2-3x/week  Also eats 1-2 protein shakes/day  Did meet with nutrition today  Has not tried anything for appetite stimulation  Only pain endorsed is mid-back pain which responds nicely to OTC and topicals  Endorses AM nausea which is relieved by zofran  Diarrhea is improving, and only bleeding is some blood when he wipes  Exertional fatigue but no overt SOB  Functionally has a walker and wheelchair  Limited to the first floor of his home but can negotiate the few stairs to get in and out  Has bars in the bathroom for toileting  Spouse endorses some concern that she will be leaving for a wedding but some family friends will be helping Yvonne Harris  Regarding goals, hopeful to resume treatments but would like to minimize time in hospital   Has a POA/AD and is looking to revise his Will this week  The following portions of the medical history were reviewed: past medical history, problem list, medication list, and social history  Current Outpatient Medications:     dronabinol (MARINOL) 2 5 mg capsule, Take 1 capsule (2 5 mg total) by mouth 2 (two) times a day before meals, Disp: 30 capsule, Rfl: 0    Blood Glucose Monitoring Suppl (OneTouch Verio Reflect) w/Device KIT, Check blood sugars once daily  Please substitute with appropriate alternative as covered by patient's insurance   Dx: E11 65, Disp: 1 kit, Rfl: 0    ciprofloxacin (CIPRO) 500 mg tablet, TAKE 1 TABLET (500 MG TOTAL) BY MOUTH 2 (TWO) TIMES A DAY ON AN EMPTY STOMACH FOR 14 DOSES , Disp: , Rfl:     famotidine (PEPCID) 40 MG tablet, Take 1 tablet (40 mg total) by mouth daily, Disp: 30 tablet, Rfl: 5    glipiZIDE (GLUCOTROL XL) 5 mg 24 hr tablet, Take 1 tablet (5 mg total) by mouth daily, Disp: 30 tablet, Rfl: 5    glucose blood (OneTouch Verio) test strip, Check blood sugars once daily  Please substitute with appropriate alternative as covered by patient's insurance  Dx: E11 65, Disp: 100 each, Rfl: 3    insulin lispro (HumaLOG KwikPen) 100 units/mL injection pen, Use as per sliding scale 3 times per day  If blood sugar is 150-200 inject 3 units SQ, if blood sugar is 201-250 inject 6 units SQ, if blood sugar is 251-300 inject 9 units, if blood sugar 301-350 inject 12 units, if blood sugars 351 -400 inject 15 units,if blood sugar over 400 call MD, Disp: 15 mL, Rfl: 3    Insulin Pen Needle 34G X 3 5 MM MISC, Use with Humalog KwikPen, Disp: 100 each, Rfl: 5    Litetouch Pen Needles 31G X 5 MM MISC, USE WITH HUMALOG KWIKPEN, Disp: , Rfl:     metFORMIN (GLUCOPHAGE) 500 mg tablet, Take 500 mg by mouth 2 (two) times a day with meals, Disp: , Rfl:     metFORMIN (GLUCOPHAGE-XR) 500 mg 24 hr tablet, Take 1 tablet once a day with dinner for 7 days, then increase to  2 tablets once a day at dinner time (Patient not taking: Reported on 7/1/2022), Disp: 60 tablet, Rfl: 3    metroNIDAZOLE (FLAGYL) 500 mg tablet, TAKE 1 TABLET (500 MG TOTAL) BY MOUTH 3 (THREE) TIMES A DAY FOR 21 DOSES , Disp: , Rfl:     OneTouch Delica Lancets 99J MISC, Check blood sugars once daily  Please substitute with appropriate alternative as covered by patient's insurance   Dx: E11 65, Disp: 100 each, Rfl: 3    predniSONE 10 mg tablet, TAKE 9 TABLETS (90 MG TOTAL) BY MOUTH DAILY FOR 7 DAYS , Disp: , Rfl:     predniSONE 20 mg tablet, TAKE 3 TABLETS (60 MG TOTAL) BY MOUTH DAILY  TAKE FOR 7 DAYS THEN AS DIRECTED BY PROVIDER , Disp: , Rfl:     predniSONE 50 mg tablet, TAKE 2 TABLETS (100 MG TOTAL) BY MOUTH DAILY FOR 7 DAYS , Disp: , Rfl:     prochlorperazine (COMPAZINE) 10 mg tablet, TAKE 1 TABLET (10 MG TOTAL) BY MOUTH EVERY 6 (SIX) HOURS AS NEEDED FOR NAUSEA OR VOMITING , Disp: , Rfl:     prochlorperazine (COMPAZINE) 10 mg tablet, Take 10 mg by mouth every 6 (six) hours as needed, Disp: , Rfl:     sulfamethoxazole-trimethoprim (BACTRIM DS) 800-160 mg per tablet, TAKE EVERY MONDAY, WEDNESDAY AND FRIDAY, Disp: , Rfl:   Review of Systems   Constitutional: Positive for activity change, appetite change, fatigue and unexpected weight change  HENT: Negative for mouth sores and trouble swallowing  Respiratory: Negative for cough, chest tightness and shortness of breath  Cardiovascular: Negative for chest pain and leg swelling  Gastrointestinal: Positive for blood in stool, diarrhea and nausea  Negative for constipation  Musculoskeletal: Positive for back pain  Negative for arthralgias  Skin: Negative for color change, pallor and wound  Neurological: Positive for weakness  Negative for dizziness, speech difficulty and light-headedness  Psychiatric/Behavioral: Negative for agitation, dysphoric mood and sleep disturbance  The patient is not nervous/anxious  All other systems negative    Objective:  Vital Signs  Pulse 85   Temp (!) 97 3 °F (36 3 °C) (Temporal)   Wt 66 9 kg (147 lb 7 8 oz)   SpO2 97%   BMI 23 81 kg/m²    Physical Exam    Constitutional: Appears thin with some noted muscle wasting  In no acute distress  Head: Normocephalic and atraumatic  Eyes: EOM are normal  Right eye exhibits no discharge  Left eye exhibits no discharge  No scleral icterus  Pulmonary/Chest: Effort normal  No stridor  No respiratory distress  Abdominal: No distension  Musculoskeletal: No edema  Temporal wasting and wasting of hands/calves appreciated     Neurological: Alert, oriented and appropriately conversant  Skin: Skin is dry, not diaphoretic  Psychiatric: Displays a normal mood and affect  Behavior, judgement and thought content appear normal    Vitals reviewed

## 2022-09-29 ENCOUNTER — HOME HEALTH ADMISSION (OUTPATIENT)
Dept: HOME HEALTH SERVICES | Facility: HOME HEALTHCARE | Age: 62
End: 2022-09-29

## 2022-09-29 ENCOUNTER — TELEPHONE (OUTPATIENT)
Dept: PALLIATIVE MEDICINE | Facility: CLINIC | Age: 62
End: 2022-09-29

## 2022-09-29 NOTE — TELEPHONE ENCOUNTER
MSW was asked to contact the pt wife to discuss resources and supports she is interested in learning about  MSW called the pt wife, Dia Nam [phone] and left a  for a return call

## 2022-10-01 ENCOUNTER — HOME CARE VISIT (OUTPATIENT)
Dept: HOME HEALTH SERVICES | Facility: HOME HEALTHCARE | Age: 62
End: 2022-10-01
Payer: COMMERCIAL

## 2022-10-01 VITALS
HEART RATE: 87 BPM | DIASTOLIC BLOOD PRESSURE: 70 MMHG | SYSTOLIC BLOOD PRESSURE: 120 MMHG | RESPIRATION RATE: 20 BRPM | OXYGEN SATURATION: 97 %

## 2022-10-01 PROCEDURE — G0151 HHCP-SERV OF PT,EA 15 MIN: HCPCS

## 2022-10-01 PROCEDURE — 400013 VN SOC

## 2022-10-01 NOTE — CASE COMMUNICATION
[facility] has admitted your patient to [address] [place] [address] service with the following disciplines:      PT  This report is informational only, no responses is needed  Primary focus of home health care strenghtening post hospitalization  Patient stated goals of care to get stronger and be able to walk wihtout the walker and get back up the steps to my bedroom  Anticipated visit pattern and next visit date  1x1wk 2 x4wks or till goals achiev ed  See medication list -   Significant clinical findings moderate drug interaction with glipizide abd sulfamethoxazale  Potential barriers to goal achievement fatigue  recent colitis   Other pertinent information none    Thank you for allowing us to participate in the care of your patient        Patty Frankel PT

## 2022-10-03 ENCOUNTER — HOME CARE VISIT (OUTPATIENT)
Dept: HOME HEALTH SERVICES | Facility: HOME HEALTHCARE | Age: 62
End: 2022-10-03
Payer: COMMERCIAL

## 2022-10-03 ENCOUNTER — TELEPHONE (OUTPATIENT)
Dept: PALLIATIVE MEDICINE | Facility: CLINIC | Age: 62
End: 2022-10-03

## 2022-10-03 NOTE — CASE COMMUNICATION
Spoke with patients wife   Reviewed medications as to name dose side effects and frequency indications wife verbalized understanding of the patients medications and denied any recent concerns or issues     Medical provider notified of wife reports dose of prednisone is 10mg 5 tabs daily  also she reports the patient is only taking the metformin daily   please update med list in epic if in agreement  Also some medications such as prednison e and metformin are listed twice in the system

## 2022-10-03 NOTE — TELEPHONE ENCOUNTER
Medical Marijuana Pre-Visit Screening for Palliative & Supportive Care    Referral Source: Dr Svetlana Cisneros  Diagnosis: CA  Is the diagnosis an approved serious medical condition as outlined by [place] Act 16: yes    History/Symptoms: Appetite    Does the patient's diagnosis fall within the current scope of our Palliative & Supportive Care practice: yes    Does the patient intend to use MMJ with palliative intent: yes      Does the patient currently have a signed controlled substances contract with another provider: no    Is the patient a resident of [place] with a valid state ID or 's license: yes    Has the patient registered on the [address] [place] [address]  website: yes    [email or web address]     Prior to any scheduled visit, the patient has been informed of the following:  · [address] [place] [address] providers are knowledgeable about many ways to help people  A visit to discuss MMJ does not mean that the provider agrees that this is the best way to help you and may make other recommendations  · There is an expectation and requirement by the [place] MMJ law for continuity of care if your certification is completed  · You will be expected to sign an informed consent  · A PDMP report will be reviewed before your visit  · This may effect your ability to purchase a handgun  · Medical marijuana products are not covered by insurance  The dispensaries do not accept credit cards  · The certification does not exempt you from any employer based drug screening programs and may effect your ability to participate in federally funded programs  · [facility] does not allow for medical marijuana possession or use at any of it's inpatient facilities  If it is brought it you will be asked to send it home with a designated representative (friend or family member)  If not one is available to take the product(s) home they will be stored in a secure location until you are discharged    · Please spend time becoming familiar with the information on the website before your visit: Terrance sanders    Date of scheduled visit:    10/11/22

## 2022-10-04 ENCOUNTER — HOME CARE VISIT (OUTPATIENT)
Dept: HOME HEALTH SERVICES | Facility: HOME HEALTHCARE | Age: 62
End: 2022-10-04
Payer: COMMERCIAL

## 2022-10-04 VITALS
SYSTOLIC BLOOD PRESSURE: 120 MMHG | OXYGEN SATURATION: 98 % | DIASTOLIC BLOOD PRESSURE: 68 MMHG | HEART RATE: 76 BPM | RESPIRATION RATE: 20 BRPM

## 2022-10-04 PROCEDURE — G0151 HHCP-SERV OF PT,EA 15 MIN: HCPCS

## 2022-10-05 PROCEDURE — G0180 MD CERTIFICATION HHA PATIENT: HCPCS

## 2022-10-06 ENCOUNTER — HOME CARE VISIT (OUTPATIENT)
Dept: HOME HEALTH SERVICES | Facility: HOME HEALTHCARE | Age: 62
End: 2022-10-06
Payer: COMMERCIAL

## 2022-10-06 VITALS
DIASTOLIC BLOOD PRESSURE: 68 MMHG | SYSTOLIC BLOOD PRESSURE: 110 MMHG | HEART RATE: 97 BPM | RESPIRATION RATE: 16 BRPM | OXYGEN SATURATION: 97 %

## 2022-10-06 PROCEDURE — G0151 HHCP-SERV OF PT,EA 15 MIN: HCPCS

## 2022-10-11 ENCOUNTER — OFFICE VISIT (OUTPATIENT)
Dept: PALLIATIVE MEDICINE | Facility: CLINIC | Age: 62
End: 2022-10-11
Payer: COMMERCIAL

## 2022-10-11 ENCOUNTER — HOME CARE VISIT (OUTPATIENT)
Dept: HOME HEALTH SERVICES | Facility: HOME HEALTHCARE | Age: 62
End: 2022-10-11
Payer: COMMERCIAL

## 2022-10-11 ENCOUNTER — SOCIAL WORK (OUTPATIENT)
Dept: PALLIATIVE MEDICINE | Facility: CLINIC | Age: 62
End: 2022-10-11

## 2022-10-11 VITALS
DIASTOLIC BLOOD PRESSURE: 60 MMHG | SYSTOLIC BLOOD PRESSURE: 100 MMHG | WEIGHT: 143.3 LBS | BODY MASS INDEX: 23.13 KG/M2 | TEMPERATURE: 97 F | HEART RATE: 113 BPM | OXYGEN SATURATION: 97 %

## 2022-10-11 DIAGNOSIS — R53.81 DEBILITY: ICD-10-CM

## 2022-10-11 DIAGNOSIS — R63.0 POOR APPETITE: ICD-10-CM

## 2022-10-11 DIAGNOSIS — Z71.89 COUNSELING AND COORDINATION OF CARE: Primary | ICD-10-CM

## 2022-10-11 DIAGNOSIS — C43.9 MALIGNANT MELANOMA (HCC): Primary | Chronic | ICD-10-CM

## 2022-10-11 PROCEDURE — 99213 OFFICE O/P EST LOW 20 MIN: CPT | Performed by: INTERNAL MEDICINE

## 2022-10-11 PROCEDURE — NC001 PR NO CHARGE

## 2022-10-11 NOTE — PROGRESS NOTES
Palliative and Supportive Care   Eliza Schmidt 60 y o  male [id number]    Assessment/Plan:  1  Malignant melanoma ([address] )    2  Poor appetite    3  Debility      D/c marinol but will try MMJ as below  Follow up in 3-4 months  The patient qualifies for use of MMJ in the state [facility] by having the following medical condition - cancer  From a palliative care stand point the patient is suffering with poor appetite  These symptoms and side effects might be alleviated with use of MMJ products  The patient registered online  The patient read and I reviewed the informed consent document with the patient  I answered all questions related to it before the patient signed it  The patient's medical certification was completed on this date  The patient was given a signed copy of the informed consent and medical certification  I issued a certification for MMJ use with palliative intent -  To help alleviate cancer related symptoms and cancer treatment related side effects  I do not endorse the belief that MMJ can treat cancer and strongly encouraged the patient to continue treatments and surveillance as recommend by cancer specialists  Requested Prescriptions      No prescriptions requested or ordered in this encounter     There are no discontinued medications  Representatives have queried the patient's controlled substance dispensing history in the Prescription Drug Monitoring Program in compliance with regulations before I have prescribed any controlled substances  The prescription history is consistent with prescribed therapy and our practice policies  30 minutes were spent face to face with Eliza Schmidt and his spouse with greater than 50% of the time spent in counseling or coordination of care including discussions of treatment instructions   All of the patient's questions were answered during this discussion  No follow-ups on file      Subjective:   Chief Complaint  Follow up visit for:  symptom management  HPI     Pal Lowe is a 60 y o  male with metastatic melanoma who presents for follow up  Since our last visit he is reported as doing overall better  Still with fatigue and generalized weakness, though these have slowly improved  Hasn't lost weight but hasn't gained any back yet either  Tried marinol without effect  Interested in trial of MMJ  The following portions of the medical history were reviewed: past medical history, problem list, medication list, and social history  Current Outpatient Medications:   •  Blood Glucose Monitoring Suppl (OneTouch Verio Reflect) w/Device KIT, Check blood sugars once daily  Please substitute with appropriate alternative as covered by patient's insurance  Dx: E11 65, Disp: 1 kit, Rfl: 0  •  dronabinol (MARINOL) 2 5 mg capsule, Take 1 capsule (2 5 mg total) by mouth 2 (two) times a day before meals, Disp: 30 capsule, Rfl: 0  •  famotidine (PEPCID) 40 MG tablet, Take 1 tablet (40 mg total) by mouth daily, Disp: 30 tablet, Rfl: 5  •  glipiZIDE (GLUCOTROL XL) 5 mg 24 hr tablet, Take 1 tablet (5 mg total) by mouth daily, Disp: 30 tablet, Rfl: 5  •  glucose blood (OneTouch Verio) test strip, Check blood sugars once daily  Please substitute with appropriate alternative as covered by patient's insurance  Dx: E11 65, Disp: 100 each, Rfl: 3  •  insulin lispro (HumaLOG KwikPen) 100 units/mL injection pen, Use as per sliding scale 3 times per day   If blood sugar is 150-200 inject 3 units SQ, if blood sugar is 201-250 inject 6 units SQ, if blood sugar is 251-300 inject 9 units, if blood sugar 301-350 inject 12 units, if blood sugars 351 -400 inject 15 units,if blood sugar over 400 call MD, Disp: 15 mL, Rfl: 3  •  Insulin Pen Needle 34G X 3 5 MM MISC, Use with Humalog KwikPen, Disp: 100 each, Rfl: 5  •  Litetouch Pen Needles 31G X 5 MM MISC, USE WITH HUMALOG KWIKPEN, Disp: , Rfl:   •  metFORMIN (GLUCOPHAGE) 500 mg tablet, Take 500 mg by mouth 2 (two) times a day with meals wife reports patient is only taking this  daily , Disp: , Rfl:   •  metFORMIN (GLUCOPHAGE-XR) 500 mg 24 hr tablet, Take 1 tablet once a day with dinner for 7 days, then increase to  2 tablets once a day at dinner time (Patient not taking: Reported on 7/1/2022), Disp: 60 tablet, Rfl: 3  •  ondansetron (ZOFRAN) 8 mg tablet, Take 8 mg by mouth every 8 (eight) hours as needed for nausea or vomiting  Indications: Nausea and Vomiting caused by Cancer Chemotherapy, Disp: , Rfl:   •  OneTouch Delica Lancets 20M MISC, Check blood sugars once daily  Please substitute with appropriate alternative as covered by patient's insurance  Dx: E11 65, Disp: 100 each, Rfl: 3  •  predniSONE 10 mg tablet, TAKE 9 TABLETS (90 MG TOTAL) BY MOUTH DAILY FOR 7 DAYS , Disp: , Rfl:   •  predniSONE 20 mg tablet, TAKE 3 TABLETS (60 MG TOTAL) BY MOUTH DAILY  TAKE FOR 7 DAYS THEN AS DIRECTED BY PROVIDER , Disp: , Rfl:   •  predniSONE 50 mg tablet, TAKE 2 TABLETS (100 MG TOTAL) BY MOUTH DAILY FOR 7 DAYS , Disp: , Rfl:   •  prochlorperazine (COMPAZINE) 10 mg tablet, TAKE 1 TABLET (10 MG TOTAL) BY MOUTH EVERY 6 (SIX) HOURS AS NEEDED FOR NAUSEA OR VOMITING , Disp: , Rfl:   •  prochlorperazine (COMPAZINE) 10 mg tablet, Take 10 mg by mouth every 6 (six) hours as needed, Disp: , Rfl:   •  sulfamethoxazole-trimethoprim (BACTRIM DS) 800-160 mg per tablet, TAKE EVERY MONDAY, WEDNESDAY AND FRIDAY, Disp: , Rfl:   Review of Systems    All other systems negative    Objective:  Vital Signs  /60 (BP Location: Right arm, Cuff Size: Standard)   Pulse (!) 113   Temp (!) 97 °F (36 1 °C) (Temporal)   Wt 65 kg (143 lb 4 8 oz)   SpO2 97%   BMI 23 13 kg/m²    Physical Exam    Constitutional: Appears ill  In no acute distress  Head: Normocephalic and atraumatic  Eyes: EOM are normal  Right eye exhibits no discharge  Left eye exhibits no discharge  No scleral icterus  Pulmonary/Chest: Effort normal  No stridor  No respiratory distress     Abdominal: No distension  Musculoskeletal: No edema  Noted muscle wasting  Neurological: Alert, oriented and appropriately conversant  Skin: Skin is dry, not diaphoretic  Psychiatric: Displays a normal mood and affect  Behavior, judgement and thought content appear normal    Vitals reviewed

## 2022-10-11 NOTE — PROGRESS NOTES
[facility]family to [facility]  Patient/family were given the opportunity to have their questions/concerns addressed  [facility] encouraged patient/family to reach out to MSW as needed for support and/or resources as needed  MSW spoke with the pt wife and she states that the pt has improved and is doing much better  She states that she was inquiring about the need for Respite or hospice  The pt daughter is getting marries this weekend and the pt wife will be traveling to the wedding  They have made arrangements for the pt to view the wedding on zoom  The pt wife states that he has DME, walker, cane, pull bars and transport chair but does not need them in the home  She states that his last PT visit will be today  The pt wife states that he is doing very well in the home but used a wheelchair today because of the distance from the car to the hospital       The pt wife states that in the future she may be interested in counseling  She states that they are doing well right now and continue to support one another  MSW has provided the pt wife with her contact information and encouraged her to call of there are any questions or concerns  I have spent 15 minutes with Patient /Family today in which greater than 50% of this time was spent in counseling/coordination of care regarding ongoing emotional support      Palliative  will follow-up as requested by patient, family, and primary team   Please contact with any specific requests

## 2022-10-13 ENCOUNTER — HOME CARE VISIT (OUTPATIENT)
Dept: HOME HEALTH SERVICES | Facility: HOME HEALTHCARE | Age: 62
End: 2022-10-13
Payer: COMMERCIAL

## 2022-10-13 VITALS
HEART RATE: 72 BPM | SYSTOLIC BLOOD PRESSURE: 126 MMHG | DIASTOLIC BLOOD PRESSURE: 60 MMHG | OXYGEN SATURATION: 98 % | RESPIRATION RATE: 20 BRPM

## 2022-10-13 PROCEDURE — G0151 HHCP-SERV OF PT,EA 15 MIN: HCPCS

## 2022-10-15 ENCOUNTER — HOME CARE VISIT (OUTPATIENT)
Dept: HOME HEALTH SERVICES | Facility: HOME HEALTHCARE | Age: 62
End: 2022-10-15
Payer: COMMERCIAL

## 2022-10-15 VITALS
DIASTOLIC BLOOD PRESSURE: 80 MMHG | HEART RATE: 91 BPM | SYSTOLIC BLOOD PRESSURE: 130 MMHG | RESPIRATION RATE: 20 BRPM | OXYGEN SATURATION: 98 %

## 2022-10-15 PROCEDURE — G0151 HHCP-SERV OF PT,EA 15 MIN: HCPCS

## 2022-10-19 ENCOUNTER — APPOINTMENT (RX ONLY)
Dept: URBAN - METROPOLITAN AREA CLINIC 144 | Facility: CLINIC | Age: 62
Setting detail: DERMATOLOGY
End: 2022-10-19

## 2022-10-19 ENCOUNTER — HOME CARE VISIT (OUTPATIENT)
Dept: HOME HEALTH SERVICES | Facility: HOME HEALTHCARE | Age: 62
End: 2022-10-19
Payer: COMMERCIAL

## 2022-10-19 VITALS
HEART RATE: 102 BPM | RESPIRATION RATE: 20 BRPM | OXYGEN SATURATION: 99 % | DIASTOLIC BLOOD PRESSURE: 70 MMHG | SYSTOLIC BLOOD PRESSURE: 110 MMHG

## 2022-10-19 DIAGNOSIS — L81.4 OTHER MELANIN HYPERPIGMENTATION: ICD-10-CM | Status: STABLE

## 2022-10-19 DIAGNOSIS — L28.1 PRURIGO NODULARIS: ICD-10-CM

## 2022-10-19 DIAGNOSIS — L81.5 LEUKODERMA, NOT ELSEWHERE CLASSIFIED: ICD-10-CM

## 2022-10-19 DIAGNOSIS — L82.0 INFLAMED SEBORRHEIC KERATOSIS: ICD-10-CM

## 2022-10-19 DIAGNOSIS — D22 MELANOCYTIC NEVI: ICD-10-CM | Status: STABLE

## 2022-10-19 DIAGNOSIS — D18.0 HEMANGIOMA: ICD-10-CM

## 2022-10-19 DIAGNOSIS — L82.1 OTHER SEBORRHEIC KERATOSIS: ICD-10-CM

## 2022-10-19 DIAGNOSIS — L57.0 ACTINIC KERATOSIS: ICD-10-CM

## 2022-10-19 PROBLEM — D18.01 HEMANGIOMA OF SKIN AND SUBCUTANEOUS TISSUE: Status: ACTIVE | Noted: 2022-10-19

## 2022-10-19 PROBLEM — D22.5 MELANOCYTIC NEVI OF TRUNK: Status: ACTIVE | Noted: 2022-10-19

## 2022-10-19 PROBLEM — D22.61 MELANOCYTIC NEVI OF RIGHT UPPER LIMB, INCLUDING SHOULDER: Status: ACTIVE | Noted: 2022-10-19

## 2022-10-19 PROBLEM — D22.72 MELANOCYTIC NEVI OF LEFT LOWER LIMB, INCLUDING HIP: Status: ACTIVE | Noted: 2022-10-19

## 2022-10-19 PROBLEM — D22.71 MELANOCYTIC NEVI OF RIGHT LOWER LIMB, INCLUDING HIP: Status: ACTIVE | Noted: 2022-10-19

## 2022-10-19 PROBLEM — D22.62 MELANOCYTIC NEVI OF LEFT UPPER LIMB, INCLUDING SHOULDER: Status: ACTIVE | Noted: 2022-10-19

## 2022-10-19 PROCEDURE — ? COUNSELING

## 2022-10-19 PROCEDURE — 17110 DESTRUCTION B9 LES UP TO 14: CPT

## 2022-10-19 PROCEDURE — G0151 HHCP-SERV OF PT,EA 15 MIN: HCPCS

## 2022-10-19 PROCEDURE — ? LIQUID NITROGEN

## 2022-10-19 PROCEDURE — 99203 OFFICE O/P NEW LOW 30 MIN: CPT | Mod: 25

## 2022-10-19 PROCEDURE — 17000 DESTRUCT PREMALG LESION: CPT | Mod: 59

## 2022-10-19 ASSESSMENT — LOCATION ZONE DERM
LOCATION ZONE: TRUNK
LOCATION ZONE: ARM
LOCATION ZONE: SCALP
LOCATION ZONE: LEG

## 2022-10-19 ASSESSMENT — LOCATION DETAILED DESCRIPTION DERM
LOCATION DETAILED: MIDDLE STERNUM
LOCATION DETAILED: PERIUMBILICAL SKIN
LOCATION DETAILED: RIGHT PROXIMAL DORSAL FOREARM
LOCATION DETAILED: RIGHT PROXIMAL PRETIBIAL REGION
LOCATION DETAILED: RIGHT ANTERIOR DISTAL THIGH
LOCATION DETAILED: RIGHT ANTERIOR DISTAL UPPER ARM
LOCATION DETAILED: RIGHT ANTERIOR PROXIMAL THIGH
LOCATION DETAILED: LEFT ANTERIOR DISTAL UPPER ARM
LOCATION DETAILED: LEFT SUPERIOR MEDIAL UPPER BACK
LOCATION DETAILED: LEFT PROXIMAL PRETIBIAL REGION
LOCATION DETAILED: LEFT SUPERIOR UPPER BACK
LOCATION DETAILED: EPIGASTRIC SKIN
LOCATION DETAILED: RIGHT ANTERIOR PROXIMAL UPPER ARM
LOCATION DETAILED: POSTERIOR MID-PARIETAL SCALP
LOCATION DETAILED: LEFT ANTERIOR DISTAL THIGH
LOCATION DETAILED: LEFT ANTERIOR PROXIMAL THIGH
LOCATION DETAILED: LEFT ANTERIOR PROXIMAL UPPER ARM

## 2022-10-19 ASSESSMENT — LOCATION SIMPLE DESCRIPTION DERM
LOCATION SIMPLE: RIGHT UPPER ARM
LOCATION SIMPLE: POSTERIOR SCALP
LOCATION SIMPLE: CHEST
LOCATION SIMPLE: RIGHT PRETIBIAL REGION
LOCATION SIMPLE: ABDOMEN
LOCATION SIMPLE: RIGHT THIGH
LOCATION SIMPLE: RIGHT FOREARM
LOCATION SIMPLE: LEFT PRETIBIAL REGION
LOCATION SIMPLE: LEFT UPPER ARM
LOCATION SIMPLE: LEFT THIGH
LOCATION SIMPLE: LEFT UPPER BACK

## 2022-10-19 NOTE — PROCEDURE: LIQUID NITROGEN
Spray Paint Technique: No
Consent: The patient's consent was obtained including but not limited to risks of crusting, scabbing, blistering, scarring, darker or lighter pigmentary change, recurrence, incomplete removal and infection.
Medical Necessity Information: It is in your best interest to select a reason for this procedure from the list below. All of these items fulfill various CMS LCD requirements except the new and changing color options.
Spray Paint Text: The liquid nitrogen was applied to the skin utilizing a spray paint frosting technique.
Medical Necessity Clause: This procedure was medically necessary because the lesions that were treated were:
Show Spray Paint Technique Variable?: Yes
Detail Level: Simple
Post-Care Instructions: I reviewed with the patient in detail post-care instructions. Patient is to wear sunprotection, and avoid picking at any of the treated lesions. Pt may apply Vaseline to crusted or scabbing areas.
Duration Of Freeze Thaw-Cycle (Seconds): 0
Detail Level: Detailed

## 2022-10-19 NOTE — PROCEDURE: COUNSELING
Detail Level: Zone
Sunscreen Recommendations: Discussed sunblock should be applied to exposed areas daily, and be reapplied every two hours while exposed . The sunblock should be broad spectrum SPF-50, UVA/UVB and contain Zinc and Titanium Dioxide(Blue Lizard & Ericjasmeet Neohu Bum are some good OTC brands). Strongly recommend Elta MD products. Recommend sun protective clothing such as long sleeve UPF protective shirts, wide brim hats, neck covers and sunglasses as it has been proven to prevent further photo damage from the sun.
Sunscreen Recommendations: Discussed sunblock should be applied to exposed areas daily, and be reapplied every two hours while exposed . The sunblock should be broad spectrum SPF-50, UVA/UVB and contain Zinc and Titanium Dioxide(Blue Lizard & Serge Anchor Bum are some good OTC brands). Strongly recommend Elta MD products. Recommend sun protective clothing such as long sleeve UPF protective shirts, wide brim hats, neck covers and sunglasses as it has been proven to prevent further photo damage from the sun.
Detail Level: Detailed

## 2022-10-21 ENCOUNTER — HOME CARE VISIT (OUTPATIENT)
Dept: HOME HEALTH SERVICES | Facility: HOME HEALTHCARE | Age: 62
End: 2022-10-21
Payer: COMMERCIAL

## 2022-10-22 NOTE — CASE COMMUNICATION
Patient called into office to cancel PT visit scheduled for today  He reports he had his MRI yesterday and from there they sent him directing to ER due to abnormality on MRI  He was told he had a stroke within the past 48hrs  He cancelled visit due to fatigue from long ER visit  Agreeable to next visit being 10 25 22    Visit pattern out of compliance    Informational only  No response required

## 2022-10-25 ENCOUNTER — HOME CARE VISIT (OUTPATIENT)
Dept: HOME HEALTH SERVICES | Facility: HOME HEALTHCARE | Age: 62
End: 2022-10-25
Payer: COMMERCIAL

## 2022-10-25 NOTE — CASE COMMUNICATION
Patient cancelled PT visits till Saturday 10 29 due to testing positive to COVID  Informational only  No response is required

## 2022-10-29 ENCOUNTER — HOME CARE VISIT (OUTPATIENT)
Dept: HOME HEALTH SERVICES | Facility: HOME HEALTHCARE | Age: 62
End: 2022-10-29
Payer: COMMERCIAL

## 2022-10-29 VITALS
DIASTOLIC BLOOD PRESSURE: 78 MMHG | RESPIRATION RATE: 20 BRPM | OXYGEN SATURATION: 97 % | SYSTOLIC BLOOD PRESSURE: 110 MMHG | HEART RATE: 97 BPM

## 2022-10-29 PROCEDURE — G0151 HHCP-SERV OF PT,EA 15 MIN: HCPCS

## 2022-11-01 ENCOUNTER — VBI (OUTPATIENT)
Dept: ADMINISTRATIVE | Facility: OTHER | Age: 62
End: 2022-11-01

## 2022-11-06 DIAGNOSIS — E11.69 DIABETES MELLITUS TYPE 2 IN OBESE (HCC): ICD-10-CM

## 2022-11-06 DIAGNOSIS — E66.9 DIABETES MELLITUS TYPE 2 IN OBESE (HCC): ICD-10-CM

## 2022-11-07 ENCOUNTER — TELEPHONE (OUTPATIENT)
Dept: FAMILY MEDICINE CLINIC | Facility: CLINIC | Age: 62
End: 2022-11-07

## 2022-11-07 DIAGNOSIS — E66.9 DIABETES MELLITUS TYPE 2 IN OBESE (HCC): ICD-10-CM

## 2022-11-07 DIAGNOSIS — E11.69 DIABETES MELLITUS TYPE 2 IN OBESE (HCC): ICD-10-CM

## 2022-11-07 RX ORDER — GLIPIZIDE 5 MG/1
5 TABLET, FILM COATED, EXTENDED RELEASE ORAL DAILY
Qty: 30 TABLET | Refills: 0 | Status: SHIPPED | OUTPATIENT
Start: 2022-11-07 | End: 2022-11-08

## 2022-11-07 NOTE — TELEPHONE ENCOUNTER
Spoke with Cornell from [facility] asking for an updated script of glipizide 5mg, patient is supposed to be on it 2 times a day, please advice

## 2022-11-08 RX ORDER — GLIPIZIDE 5 MG/1
5 TABLET, FILM COATED, EXTENDED RELEASE ORAL 2 TIMES DAILY WITH MEALS
Qty: 60 TABLET | Refills: 5 | Status: SHIPPED | OUTPATIENT
Start: 2022-11-08

## 2022-11-21 PROBLEM — E86.0 LUETSCHER'S SYNDROME: Status: RESOLVED | Noted: 2022-08-29 | Resolved: 2022-11-21

## 2022-11-22 PROBLEM — E86.0 DEHYDRATION: Status: RESOLVED | Noted: 2022-09-23 | Resolved: 2022-11-22

## 2023-02-04 DIAGNOSIS — E11.69 DIABETES MELLITUS TYPE 2 IN OBESE (HCC): Primary | ICD-10-CM

## 2023-02-04 DIAGNOSIS — E66.9 DIABETES MELLITUS TYPE 2 IN OBESE (HCC): Primary | ICD-10-CM

## 2023-06-22 ENCOUNTER — VBI (OUTPATIENT)
Dept: ADMINISTRATIVE | Facility: OTHER | Age: 63
End: 2023-06-22

## 2023-12-13 ENCOUNTER — OFFICE VISIT (OUTPATIENT)
Dept: FAMILY MEDICINE CLINIC | Facility: CLINIC | Age: 63
End: 2023-12-13
Payer: COMMERCIAL

## 2023-12-13 VITALS
BODY MASS INDEX: 29.22 KG/M2 | RESPIRATION RATE: 16 BRPM | HEIGHT: 66 IN | WEIGHT: 181.8 LBS | SYSTOLIC BLOOD PRESSURE: 118 MMHG | TEMPERATURE: 98.2 F | OXYGEN SATURATION: 97 % | HEART RATE: 82 BPM | DIASTOLIC BLOOD PRESSURE: 76 MMHG

## 2023-12-13 DIAGNOSIS — E11.69 DIABETES MELLITUS TYPE 2 IN OBESE: Chronic | ICD-10-CM

## 2023-12-13 DIAGNOSIS — M54.2 ANTERIOR NECK PAIN: ICD-10-CM

## 2023-12-13 DIAGNOSIS — H93.12 TINNITUS OF LEFT EAR: ICD-10-CM

## 2023-12-13 DIAGNOSIS — C43.9 MALIGNANT MELANOMA, UNSPECIFIED SITE (HCC): Chronic | ICD-10-CM

## 2023-12-13 DIAGNOSIS — Z00.00 ENCOUNTER FOR WELLNESS EXAMINATION IN ADULT: Primary | ICD-10-CM

## 2023-12-13 DIAGNOSIS — E03.2 HYPOTHYROIDISM DUE TO MEDICATION: ICD-10-CM

## 2023-12-13 DIAGNOSIS — C79.31 MALIGNANT NEOPLASM METASTATIC TO BRAIN (HCC): ICD-10-CM

## 2023-12-13 DIAGNOSIS — E27.3 ADRENAL INSUFFICIENCY DUE TO CANCER THERAPY (HCC): ICD-10-CM

## 2023-12-13 DIAGNOSIS — H02.536 EYELID RETRACTION OF LEFT EYE: ICD-10-CM

## 2023-12-13 DIAGNOSIS — E66.9 DIABETES MELLITUS TYPE 2 IN OBESE: Chronic | ICD-10-CM

## 2023-12-13 PROBLEM — R63.0 POOR APPETITE: Status: RESOLVED | Noted: 2022-10-11 | Resolved: 2023-12-13

## 2023-12-13 PROBLEM — G50.0 FACIAL PAIN SYNDROME: Status: RESOLVED | Noted: 2022-05-10 | Resolved: 2023-12-13

## 2023-12-13 PROBLEM — R03.0 ELEVATED BLOOD PRESSURE, SITUATIONAL: Status: RESOLVED | Noted: 2022-07-07 | Resolved: 2023-12-13

## 2023-12-13 PROBLEM — R22.1 PARAPHARYNGEAL SPACE MASS: Status: RESOLVED | Noted: 2022-07-07 | Resolved: 2023-12-13

## 2023-12-13 LAB — SL AMB POCT HEMOGLOBIN AIC: 7.5 (ref ?–6.5)

## 2023-12-13 PROCEDURE — 83036 HEMOGLOBIN GLYCOSYLATED A1C: CPT | Performed by: FAMILY MEDICINE

## 2023-12-13 PROCEDURE — 99396 PREV VISIT EST AGE 40-64: CPT | Performed by: FAMILY MEDICINE

## 2023-12-13 PROCEDURE — 99214 OFFICE O/P EST MOD 30 MIN: CPT | Performed by: FAMILY MEDICINE

## 2023-12-13 RX ORDER — ASPIRIN 81 MG/1
81 TABLET ORAL DAILY
COMMUNITY

## 2023-12-13 RX ORDER — METFORMIN HYDROCHLORIDE 500 MG/1
TABLET, EXTENDED RELEASE ORAL
Qty: 180 TABLET | Refills: 1 | Status: SHIPPED | OUTPATIENT
Start: 2023-12-13

## 2023-12-13 RX ORDER — ERYTHROMYCIN 5 MG/G
0.5 OINTMENT OPHTHALMIC EVERY 6 HOURS SCHEDULED
Qty: 3.5 G | Refills: 2 | Status: SHIPPED | OUTPATIENT
Start: 2023-12-13

## 2023-12-13 RX ORDER — LEVOTHYROXINE SODIUM 75 UG/1
75 CAPSULE ORAL
COMMUNITY
End: 2023-12-13

## 2023-12-13 RX ORDER — HYDROCORTISONE 10 MG/1
TABLET ORAL
COMMUNITY
Start: 2023-06-19

## 2023-12-13 RX ORDER — LEVOTHYROXINE SODIUM 0.05 MG/1
75 TABLET ORAL DAILY
COMMUNITY
Start: 2023-10-20 | End: 2024-10-19

## 2023-12-13 NOTE — PATIENT INSTRUCTIONS
If you consider flu vaccine-please check with your oncologist regarding appropriateness and best timing in between immunotherapy  I recommend to consider shingles vaccine if yourself and your oncologist are in agreement  Please restart metformin, 1 tab daily for 10 days then increase dose to 2 tabs daily with dinner. I would appreciate if you could start checking blood sugar in the morning few times per week and if you could send me an update via [employer]. Goal for the fasting blood sugars should be below 125  Please repeat blood work for diabetes in 3 months  Please schedule eye exam  You are likely experiencing symptoms of peripheral neuropathy due to ongoing immunotherapy.   If symptoms will worsen we could try gabapentin  Please discuss treatment of toenail fungus with your dermatologist

## 2023-12-13 NOTE — PROGRESS NOTES
Name: Steven Agudelo      : 1960      MRN: [id number]  Encounter Provider: María Elena Townsend MD  Encounter Date: 2023   Encounter department: [address] [place] [address]     1. Encounter for wellness examination in adult  Assessment & Plan:  Recommend influenza and Shingrix vaccination as long as approved by oncology since patient is undergoing immunotherapy for metastatic malignant melanoma every 3 weeks. Hold off screening colonoscopy since patient is still undergoing active cancer treatment, consider this discussion by summer 2024 when he is immunotherapy should be completed. Patient is under care of hematology oncology at [facility] AT THE [facility] with regular CT abdomen and pelvis follow-up, most recent study 2023      2. Malignant melanoma, unspecified site [facility])  Assessment & Plan:  Immunotherapy with OPDIVO every 3 weeks  [facility] hematology oncology and radiation oncology  Treated with gamma knife x 1. Subsequent adrenal insufficiency. Orders:  -     Ambulatory Referral to Ophthalmology; Future  -     US head neck soft tissue; Future; Expected date: 2023    3. Diabetes mellitus type 2 in obese   Assessment & Plan:    Lab Results   Component Value Date    HGBA1C 7.5 (A) 2023   Suboptimal glycemic control. Start Accu-Cheks at home  Restart metformin  Diabetic foot exam was performed today. Diabetic eye exam is due, referral provided  Reassess labs in 3 months. Orders:  -     Ambulatory Referral to Ophthalmology; Future  -     Lipid Panel with Direct LDL reflex; Future; Expected date: 2024  -     Hemoglobin A1C; Future; Expected date: 2024  -     Albumin / creatinine urine ratio; Future; Expected date: 2024  -     metFORMIN (GLUCOPHAGE-XR) 500 mg 24 hr tablet; Take 1 tablet once a day with dinner for 10 days, then increase dose to 2 tablets once a day with dinner  -     POCT hemoglobin A1c    4.  Adrenal insufficiency due to cancer therapy [facility])  Assessment & Plan:  Cortef 20 mg am and 10 mg pm      5. Malignant neoplasm metastatic to brain [facility])  Assessment & Plan:  Status post gamma knife procedure x 1. Patient is under care of oncology and radiation oncology. He is followed closely by [facility] team for treatment of malignant melanoma. No symptoms of headaches, dizziness or visual changes. 6. Tinnitus of left ear  Assessment & Plan:  Hearing loss  Persistent tinnitus      7. Anterior neck pain  Comments:  Patient reports bilateral lateral neck fullness with no definitive mass. Proceed with neck ultrasound. Patient will discuss at f/up with oncology in january  Orders:  -     US head neck soft tissue; Future; Expected date: 12/13/2023    8. Eyelid retraction of left eye  Assessment & Plan:  History of Acute ischemic stroke ([address]) 10/20/2022   Persistent left eyelid restriction for over a year. No visual changes. Start topical erythromycin ointment  Referral to ophthalmology    Orders:  -     erythromycin (ILOTYCIN) ophthalmic ointment; Administer 0.5 inches into the left eye every 6 (six) hours    9. Hypothyroidism due to medication  Assessment & Plan:  Managed by oncology. Current dose of levothyroxine is 75 mcg daily        Patient Instructions   If you consider flu vaccine-please check with your oncologist regarding appropriateness and best timing in between immunotherapy  I recommend to consider shingles vaccine if yourself and your oncologist are in agreement  Please restart metformin, 1 tab daily for 10 days then increase dose to 2 tabs daily with dinner. I would appreciate if you could start checking blood sugar in the morning few times per week and if you could send me an update via [employer]. Goal for the fasting blood sugars should be below 125  Please repeat blood work for diabetes in 3 months  Please schedule eye exam  You are likely experiencing symptoms of peripheral neuropathy due to ongoing immunotherapy.   If symptoms will worsen we could try gabapentin  Please discuss treatment of toenail fungus with your dermatologist      Depression Screening and Follow-up Plan: Patient was screened for depression during today's encounter. They screened negative with a PHQ-2 score of 0. Subjective     Annual well exam  Patient is under care of [facility] hem- oncology Follows  Re : metastatic malignant  melanoma, no promary skin lesion was def . detected  Immunotherapy  3 weeks [facility], Inivolumab (OPDIVO). Patient also follows with , dermatology, for periodic skin exam  H/o  treatment  of gamma knife x 1  Recent MRI brain in 7/23 and 11/2023 1. Minimal enlargement of left middle frontal gyrus lesion. No new metastatic lesion is visualized. Pending OV with rad -oncology- Byron Le in a few weeks   -  oncology    CT AP 10/23 - IMPRESSION:    1. Stable examination of the chest, abdomen and pelvis with no evidence of new   metastatic disease. 2. In the left lower lobe is a reference nodularity currently measuring 1.5 x   0.8 cm, previously 1.8 x 1.0 cm therefore decreased in size. 3. Stable or slightly decreased 0.6 cm soft tissue nodule in the left   ischiorectal fossa. Irritated left lower eyelid for nearly a year . No pain, no ocular d/c or visual changes     Off all Rx for type 2 DM   HbA1C was 7.5%  today    Patient has insulin on hand but has not used it for a while. Previously he was managed with metformin and glipizide. Symptoms of colitis have completely resolved. Patient reports normal appetite, no abdominal pain or GI symptoms. He has been experiencing symptoms of cold sensation of his feet as well as subjective sensation of blisters with no open skin areas or actual blisters on his feet. Chronic irritation of left lower eyelid. Left eyelid is retracted with chronic irritation. Patient denies visual changes or excessive lacrimation. No ocular discharge.   He has not had recent eye exam.          Review of Systems   Constitutional:  Positive for fatigue. HENT: Negative. Eyes: Negative. As per HPI   Respiratory: Negative. Cardiovascular: Negative. Gastrointestinal: Negative. Endocrine: Negative. Genitourinary: Negative. Musculoskeletal: Negative. Skin: Negative. Allergic/Immunologic: Negative. Neurological:  Positive for numbness (peripheral neuropathy affecting feet). Hematological: Negative. Psychiatric/Behavioral: Negative.          Past Medical History:   Diagnosis Date   • Brain cancer [facility])    • Colitis    • Environmental allergies    • Melanoma [facility])      Past Surgical History:   Procedure Laterality Date   • CT NEEDLE BIOPSY LUNG  7/25/2022   • FL INJECTION RIGHT SHOULDER (ARTHROGRAM)  6/1/2021   • HERNIA REPAIR      Last Assessed:  12/29/16     Family History   Problem Relation Age of Onset   • Breast cancer Mother    • Other Mother         Laryngeal cancer   • Colon cancer Family      Social History     Socioeconomic History   • Marital status: /Civil Union     Spouse name: None   • Number of children: None   • Years of education: None   • Highest education level: None   Occupational History   • None   Tobacco Use   • Smoking status: Never   • Smokeless tobacco: Never   Vaping Use   • Vaping status: Never Used   Substance and Sexual Activity   • Alcohol use: Yes     Comment: social   • Drug use: No   • Sexual activity: Yes   Other Topics Concern   • None   Social History Narrative   • None     Social Determinants of Health     Financial Resource Strain: Low Risk  (2/14/2023)    Received from [address] Strain ([facility])    • Difficulty of Paying Living Expenses: Not very hard   Food Insecurity: No Food Insecurity (2/14/2023)    Received from [address]    • Worried About [place] in the Last Year: Never true    • Ran Out of Food in the Last Year: Never true Transportation Needs: No Transportation Needs (2/14/2023)    Received from [address] - Transportation    • Lack of Transportation (Medical): No    • Lack of Transportation (Non-Medical): No   Physical Activity: Not on file   Stress: Not on file   Social Connections: Not on file   Intimate Partner Violence: Not At Risk (2/14/2023)    Received from [address], Afraid, Rape, and Kick questionnaire    • Fear of Current or Ex-Partner: No    • Emotionally Abused: No    • Physically Abused: No    • Sexually Abused: No   Housing Stability: Low Risk  (2/14/2023)    Received from [address] [place] [address] Sign    • Unable to Pay for Housing in the Last Year: No    • Number of [address] in the Last Year: 1    • Unstable Housing in the Last Year: No     Current Outpatient Medications on File Prior to Visit   Medication Sig   • aspirin (ECOTRIN LOW STRENGTH) 81 mg EC tablet Take 81 mg by mouth daily   • Blood Glucose Monitoring Suppl (OneTouch Verio Reflect) w/Device KIT Check blood sugars once daily. Please substitute with appropriate alternative as covered by patient's insurance. Dx: E11.65   • glucose blood (OneTouch Verio) test strip Check blood sugars once daily. Please substitute with appropriate alternative as covered by patient's insurance. Dx: E11.65   • hydrocortisone (CORTEF) 10 mg tablet Take 2 tablets by mouth in the morning and 1 tablet by mouth in the evening   • Insulin Pen Needle 34G X 3.5 MM MISC Use with Humalog KwikPen   • levothyroxine 50 mcg tablet Take 75 mcg by mouth daily   • Litetouch Pen Needles 31G X 5 MM MISC USE WITH HUMALOG KWIKPEN   • insulin lispro (HumaLOG KwikPen) 100 units/mL injection pen Use as per sliding scale 3 times per day.  If blood sugar is 150-200 inject 3 units SQ, if blood sugar is 201-250 inject 6 units SQ, if blood sugar is 251-300 inject 9 units, if blood sugar 301-350 inject 12 units, if blood sugars 351 -400 inject 15 units,if blood sugar over 400 call MD (Patient not taking: Reported on 12/13/2023)   • OneTouch Delica Lancets 36N MISC Check blood sugars once daily. Please substitute with appropriate alternative as covered by patient's insurance. Dx: E11.65 (Patient not taking: Reported on 12/13/2023)     Allergies   Allergen Reactions   • Morphine Nausea Only, Headache and Hypertension     Intolerance     Immunization History   Administered Date(s) Administered   • COVID-19 J&J (Karsten) vaccine 0.5 mL 05/07/2021, 12/03/2021   • Tdap 09/02/2021       Objective     /76 (BP Location: Left arm, Patient Position: Sitting, Cuff Size: Large)   Pulse 82   Temp 98.2 °F (36.8 °C) (Temporal)   Resp 16   Ht 5' 6" (1.676 m)   Wt 82.5 kg (181 lb 12.8 oz)   SpO2 97%   BMI 29.34 kg/m²     Physical Exam  Vitals and nursing note reviewed. Constitutional:       General: He is not in acute distress. Appearance: Normal appearance. He is well-developed. He is not ill-appearing. HENT:      Head: Normocephalic and atraumatic. Eyes:      Conjunctiva/sclera:      Right eye: Right conjunctiva is not injected. Left eye: Left conjunctiva is not injected. Comments: Retracted  left lower eyelid with inner lid irritation. Neck:      Vascular: No carotid bruit. Cardiovascular:      Rate and Rhythm: Normal rate and regular rhythm. Pulses: no weak pulses          Dorsalis pedis pulses are 2+ on the right side and 2+ on the left side. Heart sounds: Normal heart sounds. No murmur heard. Pulmonary:      Effort: Pulmonary effort is normal. No respiratory distress. Breath sounds: Normal breath sounds. No wheezing or rales. Abdominal:      General: Bowel sounds are normal. There is no distension or abdominal bruit. Palpations: Abdomen is soft. Tenderness: There is no abdominal tenderness. Musculoskeletal:         General: Normal range of motion.       Cervical back: Neck supple. Right lower leg: No edema. Left lower leg: No edema. Feet:    Feet:      Right foot:      Skin integrity: No ulcer, skin breakdown, erythema, warmth, callus or dry skin. Left foot:      Skin integrity: No ulcer, skin breakdown, erythema, warmth, callus or dry skin. Skin:     Comments: Onychomycosis  BL great toenails   Neurological:      General: No focal deficit present. Mental Status: He is alert and oriented to person, place, and time. Cranial Nerves: No cranial nerve deficit. Psychiatric:         Mood and Affect: Mood normal.         Behavior: Behavior normal.     Diabetic Foot Exam    Patient's shoes and socks removed. Right Foot/Ankle   Right Foot Inspection  Skin Exam: skin normal and skin intact. No dry skin, no warmth, no callus, no erythema, no maceration, no abnormal color, no pre-ulcer, no ulcer and no callus. Toe Exam: ROM and strength within normal limits. Sensory   Vibration: intact  Proprioception: intact  Monofilament testing: intact    Vascular  The right DP pulse is 2+. Left Foot/Ankle  Left Foot Inspection  Skin Exam: skin normal and skin intact. No dry skin, no warmth, no erythema, no maceration, normal color, no pre-ulcer, no ulcer and no callus. Toe Exam: ROM and strength within normal limits. Sensory   Vibration: intact  Proprioception: intact  Monofilament testing: intact    Vascular  The left DP pulse is 2+.      Assign Risk Category  No deformity present  No loss of protective sensation  No weak pulses  Risk: 0        Steff Santizo MD

## 2023-12-16 PROBLEM — K52.9 COLITIS: Status: RESOLVED | Noted: 2022-09-22 | Resolved: 2023-12-16

## 2023-12-16 PROBLEM — E03.2 HYPOTHYROIDISM DUE TO MEDICATION: Status: ACTIVE | Noted: 2023-12-16

## 2023-12-16 PROBLEM — E06.4 DRUG-INDUCED THYROIDITIS: Status: RESOLVED | Noted: 2023-04-18 | Resolved: 2023-12-16

## 2023-12-16 PROBLEM — H02.536: Status: ACTIVE | Noted: 2023-12-16

## 2023-12-16 PROBLEM — E06.4 DRUG-INDUCED THYROIDITIS: Status: ACTIVE | Noted: 2023-04-18

## 2023-12-16 PROBLEM — R53.81 DEBILITY: Status: RESOLVED | Noted: 2022-10-11 | Resolved: 2023-12-16

## 2023-12-16 PROBLEM — Z00.00 ENCOUNTER FOR WELLNESS EXAMINATION IN ADULT: Status: ACTIVE | Noted: 2023-12-16

## 2023-12-16 PROBLEM — E11.29 MICROALBUMINURIA DUE TO TYPE 2 DIABETES MELLITUS: Chronic | Status: RESOLVED | Noted: 2022-04-16 | Resolved: 2023-12-16

## 2023-12-16 PROBLEM — R80.9 MICROALBUMINURIA DUE TO TYPE 2 DIABETES MELLITUS: Chronic | Status: RESOLVED | Noted: 2022-04-16 | Resolved: 2023-12-16

## 2023-12-16 NOTE — ASSESSMENT & PLAN NOTE
History of Acute ischemic stroke ([address]) 10/20/2022   Persistent left eyelid restriction for over a year. No visual changes.   Start topical erythromycin ointment  Referral to ophthalmology

## 2023-12-16 NOTE — ASSESSMENT & PLAN NOTE
Lab Results   Component Value Date    HGBA1C 7.5 (A) 12/13/2023   Suboptimal glycemic control. Start Accu-Cheks at home  Restart metformin  Diabetic foot exam was performed today. Diabetic eye exam is due, referral provided  Reassess labs in 3 months.

## 2023-12-16 NOTE — ASSESSMENT & PLAN NOTE
Recommend influenza and Shingrix vaccination as long as approved by oncology since patient is undergoing immunotherapy for metastatic malignant melanoma every 3 weeks. Hold off screening colonoscopy since patient is still undergoing active cancer treatment, consider this discussion by summer 2024 when he is immunotherapy should be completed.   Patient is under care of hematology oncology at [facility] AT THE [facility] with regular CT abdomen and pelvis follow-up, most recent study October 2023

## 2023-12-16 NOTE — ASSESSMENT & PLAN NOTE
Status post gamma knife procedure x 1. Patient is under care of oncology and radiation oncology. He is followed closely by [facility] team for treatment of malignant melanoma. No symptoms of headaches, dizziness or visual changes.

## 2023-12-16 NOTE — ASSESSMENT & PLAN NOTE
Immunotherapy with OPDIVO every 3 weeks  [facility] hematology oncology and radiation oncology  Treated with gamma knife x 1. Subsequent adrenal insufficiency.

## 2024-01-03 ENCOUNTER — NEW PATIENT COMPREHENSIVE (OUTPATIENT)
Dept: URBAN - METROPOLITAN AREA CLINIC 6 | Facility: CLINIC | Age: 64
End: 2024-01-03

## 2024-01-03 DIAGNOSIS — H00.15: ICD-10-CM

## 2024-01-03 DIAGNOSIS — H40.023: ICD-10-CM

## 2024-01-03 DIAGNOSIS — H25.13: ICD-10-CM

## 2024-01-03 DIAGNOSIS — E11.9: ICD-10-CM

## 2024-01-03 LAB
LEFT EYE DIABETIC RETINOPATHY: NORMAL
RIGHT EYE DIABETIC RETINOPATHY: NORMAL

## 2024-01-03 PROCEDURE — 92004 COMPRE OPH EXAM NEW PT 1/>: CPT

## 2024-01-03 PROCEDURE — 92133 CPTRZD OPH DX IMG PST SGM ON: CPT

## 2024-01-03 PROCEDURE — 76514 ECHO EXAM OF EYE THICKNESS: CPT

## 2024-01-03 ASSESSMENT — PACHYMETRY
OD_CT_UM: 538
OS_CT_UM: 538

## 2024-01-03 ASSESSMENT — VISUAL ACUITY
OD_SC: J7
OS_SC: J5
OD_SC: 20/40-2
OS_SC: 20/30-1

## 2024-01-03 ASSESSMENT — TONOMETRY
OD_IOP_MMHG: 22
OS_IOP_MMHG: 22

## 2024-01-15 ENCOUNTER — HOSPITAL ENCOUNTER (OUTPATIENT)
Dept: ULTRASOUND IMAGING | Facility: HOSPITAL | Age: 64
Discharge: HOME | End: 2024-01-15
Payer: COMMERCIAL

## 2024-01-15 DIAGNOSIS — M54.2 ANTERIOR NECK PAIN: ICD-10-CM

## 2024-01-15 DIAGNOSIS — C43.9 MALIGNANT MELANOMA, UNSPECIFIED SITE (HCC): ICD-10-CM

## 2024-01-15 PROCEDURE — 76536 US EXAM OF HEAD AND NECK: CPT

## 2024-02-14 PROBLEM — Z00.00 ENCOUNTER FOR WELLNESS EXAMINATION IN ADULT: Status: RESOLVED | Noted: 2023-12-16 | Resolved: 2024-02-14

## 2024-03-24 DIAGNOSIS — E11.69 DIABETES MELLITUS TYPE 2 IN OBESE: Primary | Chronic | ICD-10-CM

## 2024-03-24 DIAGNOSIS — E66.9 DIABETES MELLITUS TYPE 2 IN OBESE: Primary | Chronic | ICD-10-CM

## 2024-03-24 RX ORDER — GLIPIZIDE 2.5 MG/1
2.5 TABLET, EXTENDED RELEASE ORAL 2 TIMES DAILY
Qty: 60 TABLET | Refills: 5 | Status: SHIPPED | OUTPATIENT
Start: 2024-03-24

## 2024-04-09 ENCOUNTER — FOLLOW UP (OUTPATIENT)
Dept: URBAN - METROPOLITAN AREA CLINIC 6 | Facility: CLINIC | Age: 64
End: 2024-04-09

## 2024-04-09 DIAGNOSIS — H00.15: ICD-10-CM

## 2024-04-09 DIAGNOSIS — H25.13: ICD-10-CM

## 2024-04-09 DIAGNOSIS — H40.1131: ICD-10-CM

## 2024-04-09 PROCEDURE — 99214 OFFICE O/P EST MOD 30 MIN: CPT

## 2024-04-09 PROCEDURE — 92083 EXTENDED VISUAL FIELD XM: CPT

## 2024-04-09 ASSESSMENT — VISUAL ACUITY
OD_SC: 20/30
OS_SC: 20/20-1

## 2024-04-09 ASSESSMENT — TONOMETRY
OD_IOP_MMHG: 25
OD_IOP_MMHG: 25
OS_IOP_MMHG: 24
OS_IOP_MMHG: 24

## 2024-04-15 NOTE — CASE COMMUNICATION
Patient discharged for Home Health PT intervention this date  Upon discharge patient is ambulatory without AD all surfaces  Indep with transfers household surfaces  Patient was instructed in and indep with hep of theraband strengthening ex for le sitting and standing and ue strengthening with wts    Recommend OPPT gain additional strength and endurance prior to resuming chemo MEDICATIONS  (STANDING):  apixaban 2.5 milliGRAM(s) Oral two times a day  atorvastatin 10 milliGRAM(s) Oral at bedtime  cefTRIAXone   IVPB 1000 milliGRAM(s) IV Intermittent every 24 hours  folic acid 1 milliGRAM(s) Oral daily  latanoprost 0.005% Ophthalmic Solution 1 Drop(s) Both EYES at bedtime  levothyroxine 125 MICROGram(s) Oral daily  melatonin 3 milliGRAM(s) Oral at bedtime  metoprolol succinate ER 25 milliGRAM(s) Oral daily  sodium chloride 0.45%. 1000 milliLiter(s) (45 mL/Hr) IV Continuous <Continuous>    MEDICATIONS  (PRN):

## 2024-05-20 ENCOUNTER — NEW REFERRAL (OUTPATIENT)
Dept: URBAN - METROPOLITAN AREA CLINIC 6 | Facility: CLINIC | Age: 64
End: 2024-05-20

## 2024-05-20 DIAGNOSIS — D48.5: ICD-10-CM

## 2024-05-20 DIAGNOSIS — C69.30: ICD-10-CM

## 2024-05-20 PROCEDURE — 99243 OFF/OP CNSLTJ NEW/EST LOW 30: CPT

## 2024-05-20 PROCEDURE — 92285 EXTERNAL OCULAR PHOTOGRAPHY: CPT

## 2024-05-20 PROCEDURE — 67810 INCAL BX EYELID SKN LID MRGN: CPT

## 2024-05-20 ASSESSMENT — VISUAL ACUITY
OD_SC: 20/30-1
OS_SC: 20/20-2
OU_SC: J3

## 2024-06-12 ENCOUNTER — IOP CHECK (OUTPATIENT)
Dept: URBAN - METROPOLITAN AREA CLINIC 6 | Facility: CLINIC | Age: 64
End: 2024-06-12

## 2024-06-12 DIAGNOSIS — D48.5: ICD-10-CM

## 2024-06-12 DIAGNOSIS — H25.13: ICD-10-CM

## 2024-06-12 DIAGNOSIS — C69.30: ICD-10-CM

## 2024-06-12 DIAGNOSIS — H40.1131: ICD-10-CM

## 2024-06-12 PROCEDURE — 99214 OFFICE O/P EST MOD 30 MIN: CPT

## 2024-06-12 ASSESSMENT — TONOMETRY
OS_IOP_MMHG: 27
OD_IOP_MMHG: 21
OS_IOP_MMHG: 24
OD_IOP_MMHG: 23

## 2024-06-12 ASSESSMENT — VISUAL ACUITY
OS_PH: 20/25-2
OS_SC: 20/30+2
OD_SC: 20/40+2
OD_PH: 20/30+2

## 2024-09-03 ENCOUNTER — RA CDI HCC (OUTPATIENT)
Dept: OTHER | Facility: HOSPITAL | Age: 64
End: 2024-09-03

## 2024-09-06 ENCOUNTER — CONSULT (OUTPATIENT)
Dept: FAMILY MEDICINE CLINIC | Facility: CLINIC | Age: 64
End: 2024-09-06
Payer: COMMERCIAL

## 2024-09-06 VITALS
BODY MASS INDEX: 28.45 KG/M2 | WEIGHT: 177 LBS | HEIGHT: 66 IN | SYSTOLIC BLOOD PRESSURE: 140 MMHG | HEART RATE: 83 BPM | OXYGEN SATURATION: 97 % | RESPIRATION RATE: 16 BRPM | TEMPERATURE: 98.2 F | DIASTOLIC BLOOD PRESSURE: 70 MMHG

## 2024-09-06 DIAGNOSIS — C79.31 MALIGNANT NEOPLASM METASTATIC TO BRAIN (HCC): ICD-10-CM

## 2024-09-06 DIAGNOSIS — E03.2 HYPOTHYROIDISM DUE TO MEDICATION: ICD-10-CM

## 2024-09-06 DIAGNOSIS — C44.1192 BASAL CELL CARCINOMA (BCC) OF SKIN OF LEFT LOWER EYELID INCLUDING CANTHUS: ICD-10-CM

## 2024-09-06 DIAGNOSIS — C43.9 MALIGNANT MELANOMA, UNSPECIFIED SITE (HCC): Chronic | ICD-10-CM

## 2024-09-06 DIAGNOSIS — Z01.818 PRE-OP EXAMINATION: Primary | ICD-10-CM

## 2024-09-06 DIAGNOSIS — E11.69 TYPE 2 DIABETES MELLITUS WITH OBESITY  (HCC): Chronic | ICD-10-CM

## 2024-09-06 DIAGNOSIS — Z12.5 PROSTATE CANCER SCREENING: ICD-10-CM

## 2024-09-06 DIAGNOSIS — E27.3 ADRENAL INSUFFICIENCY DUE TO CANCER THERAPY (HCC): ICD-10-CM

## 2024-09-06 DIAGNOSIS — E66.9 TYPE 2 DIABETES MELLITUS WITH OBESITY  (HCC): Chronic | ICD-10-CM

## 2024-09-06 PROCEDURE — 99214 OFFICE O/P EST MOD 30 MIN: CPT | Performed by: FAMILY MEDICINE

## 2024-09-06 RX ORDER — GLIPIZIDE 2.5 MG/1
2.5 TABLET, EXTENDED RELEASE ORAL 2 TIMES DAILY
Start: 2024-09-06

## 2024-09-06 NOTE — ASSESSMENT & PLAN NOTE
Lab Results   Component Value Date    HGBA1C 7.5 (A) 12/13/2023   Metformin caused GI SE  Hemoglobin A1c performed in the office today is 8.6%  Patient will restart glipizide ER 5 mg daily  Update with blood sugars in 2 to 3 weeks  Repeat labs in 3 months.

## 2024-09-06 NOTE — PATIENT INSTRUCTIONS
We will forward surgical clearance to dermatology  If Dr. Velazquez needs additional clearance-please ask his office to fax it to our back office fax at   Hemoglobin A1c is 8.6%, goal is below 6.5%  Please restart glipizide 2.5 mg twice a day or 5 mg daily  We should repeat your blood work and urine test in 3 months, 12-hour fasting  Please update me with blood glucose readings in a few weeks, goal for fasting sugar to be below 125

## 2024-09-06 NOTE — PROGRESS NOTES
Ambulatory Visit  Name: Cecilio Hebert      : 1960      MRN: [id number]  Encounter Provider: Kimberly Cross MD  Encounter Date: 2024   Encounter department: [facility]    Assessment & Plan   1. Pre-op examination  Comments:  Patient is acceptable risk for for Mohs surgery and lower eyelid surgery on 2024 as planned.  2. Basal cell carcinoma (BCC) of skin of left lower eyelid including canthus  3. Adrenal insufficiency due to cancer therapy (HCC)  Assessment & Plan:  Cortef 20 mg in the morning and 10 mg in the afternoon.  Rx is managed by hematology oncology  4. Hypothyroidism due to medication  Assessment & Plan:  Patient is on levothyroxine, managed by Dr. Doss  Orders:  -     TSH, 3rd generation; Future; Expected date: 2024  -     T4, free; Future; Expected date: 2024  5. Type 2 diabetes mellitus with obesity  (HCC)  Assessment & Plan:    Lab Results   Component Value Date    HGBA1C 7.5 (A) 2023   Metformin caused GI SE  Hemoglobin A1c performed in the office today is 8.6%  Patient will restart glipizide ER 5 mg daily  Update with blood sugars in 2 to 3 weeks  Repeat labs in 3 months.  Orders:  -     glipiZIDE (GLUCOTROL XL) 2.5 mg 24 hr tablet; Take 1 tablet (2.5 mg total) by mouth 2 (two) times a day  -     Lipid Panel with Direct LDL reflex; Future; Expected date: 2024  -     Comprehensive metabolic panel; Future; Expected date: 2024  -     Hemoglobin A1C; Future; Expected date: 2024  -     Albumin / creatinine urine ratio; Future; Expected date: 2024  -     CBC and differential; Future; Expected date: 2024  6. Malignant melanoma, unspecified site (HCC)  Assessment & Plan:  The patient remains under care of [facility] hematology oncology.  Treatment 2024  PET scan performed in 2024: Stable.    7. 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 Prostate cancer screening  -     PSA, Total Screen; Future;  Expected date: 12/06/2024         Patient Instructions   We will forward surgical clearance to dermatology  If Dr. Velazquez needs additional clearance-please ask his office to fax it to our back office fax at   Hemoglobin A1c is 8.6%, goal is below 6.5%  Please restart glipizide 2.5 mg twice a day or 5 mg daily  We should repeat your blood work and urine test in 3 months, 12-hour fasting  Please update me with blood glucose readings in a few weeks, goal for fasting sugar to be below 125      History of Present Illness      PRE-OP exam   BCC left lower eyelid   MOHS by dermatology - , 9/16/24 and ophthalmology, Dr. Velazquez 9/16/24  The patient has been feeling generally well.  He remains under care of oncology at [facility] for treatment of metastatic melanoma.  Last treatment in June.   PET scan 7/24  1. Relatively normal distribution of FDG in brain without definite abnormal   focal hypermetabolism, including at known left frontal lesion seen on prior MRI   of brain from 7/5/2024. Continued follow-up with MRI of brain is recommended.   2. No evidence of hypermetabolic metastatic lymphadenopathy in the body. Likely   small focal nodular scar in left lower lobe and left ischial fossa without   abnormal FDG uptake.   3. Likely foci of degenerative changes in lumbar spine, shoulders, knees, and   right foot.     Type II diabetes: Patient self discontinued metformin and glipizide.  He reports that his blood sugars have been well-controlled at the time.  No recent hemoglobin A1c.    Current medications: Levothyroxine, aspirin Cortef as per Dr. Doss (hematology oncology).      Patient reports that blood pressures have been well-controlled lately, he has been monitoring during treatment send at home.    Patient offers no complaints of chest pain, palpitations, shortness of breath or dizziness.      Review of Systems   Constitutional: Negative.    HENT: Negative.     Eyes: Negative.    Respiratory: Negative.      Cardiovascular: Negative.    Gastrointestinal: Negative.    Endocrine: Negative.    Genitourinary: Negative.    Skin:  Positive for rash.        As per HPI   Allergic/Immunologic: Negative.    Neurological: Negative.    Hematological: Negative.    Psychiatric/Behavioral: Negative.       Past Medical History:   Diagnosis Date   • Brain cancer (HCC)    • Colitis    • Environmental allergies    • Melanoma (HCC)      Past Surgical History:   Procedure Laterality Date   • CT NEEDLE BIOPSY LUNG  7/25/2022   • FL INJECTION RIGHT SHOULDER (ARTHROGRAM)  6/1/2021   • HERNIA REPAIR      Last Assessed:  12/29/16     Family History   Problem Relation Age of Onset   • Breast cancer Mother    • Other Mother         Laryngeal cancer   • Colon cancer Family      Social History     Tobacco Use   • Smoking status: Never   • Smokeless tobacco: Never   Vaping Use   • Vaping status: Never Used   Substance and Sexual Activity   • Alcohol use: Yes     Comment: social   • Drug use: No   • Sexual activity: Yes     Current Outpatient Medications on File Prior to Visit   Medication Sig   • aspirin (ECOTRIN LOW STRENGTH) 81 mg EC tablet Take 81 mg by mouth daily   • Blood Glucose Monitoring Suppl (OneTouch Verio Reflect) w/Device KIT Check blood sugars once daily. Please substitute with appropriate alternative as covered by patient's insurance. Dx: E11.65   • glucose blood (OneTouch Verio) test strip Check blood sugars once daily. Please substitute with appropriate alternative as covered by patient's insurance. Dx: E11.65   • hydrocortisone (CORTEF) 10 mg tablet Take 2 tablets by mouth in the morning and 1 tablet by mouth in the evening   • levothyroxine 50 mcg tablet Take 75 mcg by mouth daily   • Litetouch Pen Needles 31G X 5 MM MISC USE WITH HUMALOG KWIKPEN   • OneTouch Delica Lancets 33G MISC Check blood sugars once daily. Please substitute with appropriate alternative as covered by patient's insurance. Dx: E11.65     Allergies  "  Allergen Reactions   • Morphine Nausea Only, Headache and Hypertension     Intolerance     Immunization History   Administered Date(s) Administered   • COVID-19 J&J ([employer]) vaccine 0.5 mL 05/07/2021, 12/02/2021, 12/03/2021   • COVID-19 PFIZER VACCINE 0.3 ML IM 01/27/2021, 02/17/2021   • Tdap 09/02/2021     Objective     /70   Pulse 83   Temp 98.2 °F (36.8 °C) (Temporal)   Resp 16   Ht 5' 6\" (1.676 m)   Wt 80.3 kg (177 lb)   SpO2 97%   BMI 28.57 kg/m²     Physical Exam  Vitals and nursing note reviewed.   Constitutional:       General: He is not in acute distress.     Appearance: Normal appearance. He is not ill-appearing.   HENT:      Head: Normocephalic and atraumatic.   Eyes:      General: No scleral icterus.     Conjunctiva/sclera: Conjunctivae normal.   Neck:      Vascular: No carotid bruit.   Cardiovascular:      Rate and Rhythm: Normal rate and regular rhythm.      Pulses: no weak pulses.           Dorsalis pedis pulses are 2+ on the right side and 2+ on the left side.      Heart sounds: Normal heart sounds. No murmur heard.  Pulmonary:      Effort: Pulmonary effort is normal. No respiratory distress.      Breath sounds: Normal breath sounds.   Abdominal:      General: Bowel sounds are normal. There is no distension.      Palpations: Abdomen is soft.   Musculoskeletal:         General: Normal range of motion.      Cervical back: Neck supple. No rigidity.      Right lower leg: No edema.      Left lower leg: No edema.   Feet:      Right foot:      Skin integrity: No ulcer, skin breakdown, erythema, warmth, callus or dry skin.      Left foot:      Skin integrity: No ulcer, skin breakdown, erythema, warmth, callus or dry skin.   Skin:     General: Skin is warm.      Findings: No rash.      Comments: Irritated/scaly skin left lower eyelid   Neurological:      General: No focal deficit present.      Mental Status: He is alert and oriented to person, place, and time.   Psychiatric:         Mood and " Affect: Mood normal.         Behavior: Behavior normal.         Thought Content: Thought content normal.     Diabetic Foot Exam    Patient's shoes and socks removed.    Right Foot/Ankle   Right Foot Inspection  Skin Exam: skin normal and skin intact. No dry skin, no warmth, no callus, no erythema, no maceration, no abnormal color, no pre-ulcer, no ulcer and no callus.     Toe Exam: ROM and strength within normal limits.     Sensory   Vibration: intact  Proprioception: intact  Monofilament testing: intact    Vascular  The right DP pulse is 2+.     Left Foot/Ankle  Left Foot Inspection  Skin Exam: skin normal and skin intact. No dry skin, no warmth, no erythema, no maceration, normal color, no pre-ulcer, no ulcer and no callus.     Toe Exam: ROM and strength within normal limits.     Sensory   Vibration: intact  Proprioception: intact  Monofilament testing: intact    Vascular  The left DP pulse is 2+.     Assign Risk Category  No deformity present  No loss of protective sensation  No weak pulses  Risk: 0

## 2024-09-09 ENCOUNTER — TELEPHONE (OUTPATIENT)
Dept: FAMILY MEDICINE CLINIC | Facility: CLINIC | Age: 64
End: 2024-09-09

## 2024-09-10 NOTE — ASSESSMENT & PLAN NOTE
The patient remains under care of [facility] hematology oncology.  Treatment June 2024  PET scan performed in July 2024: Stable.

## 2024-09-10 NOTE — TELEPHONE ENCOUNTER
I have completed patient's preop evaluation.  Surgery is scheduled on September 16th.    We was supposed to receive pre op forms from dermatology, Dr. Otero ,and ophthalmology, Dr. Velazquez.They are performing combo case.    Thank you

## 2024-10-07 ENCOUNTER — POST-OP CHECK (OUTPATIENT)
Dept: URBAN - METROPOLITAN AREA CLINIC 6 | Facility: CLINIC | Age: 64
End: 2024-10-07

## 2024-10-07 DIAGNOSIS — D48.5: ICD-10-CM

## 2024-10-07 DIAGNOSIS — Z98.890: ICD-10-CM

## 2024-10-07 DIAGNOSIS — C69.30: ICD-10-CM

## 2024-10-07 PROCEDURE — 99024 POSTOP FOLLOW-UP VISIT: CPT

## 2024-10-07 ASSESSMENT — VISUAL ACUITY
OD_SC: 20/50+1
OS_SC: 20/25

## 2024-11-18 ENCOUNTER — OFFICE VISIT (OUTPATIENT)
Dept: FAMILY MEDICINE CLINIC | Facility: CLINIC | Age: 64
End: 2024-11-18
Payer: COMMERCIAL

## 2024-11-18 VITALS
TEMPERATURE: 98.4 F | RESPIRATION RATE: 18 BRPM | HEART RATE: 76 BPM | WEIGHT: 174.6 LBS | BODY MASS INDEX: 28.18 KG/M2 | OXYGEN SATURATION: 97 %

## 2024-11-18 DIAGNOSIS — J40 BRONCHITIS: Primary | ICD-10-CM

## 2024-11-18 PROCEDURE — 99213 OFFICE O/P EST LOW 20 MIN: CPT | Performed by: FAMILY MEDICINE

## 2024-11-18 RX ORDER — BENZONATATE 100 MG/1
100 CAPSULE ORAL 3 TIMES DAILY PRN
Qty: 20 CAPSULE | Refills: 0 | Status: SHIPPED | OUTPATIENT
Start: 2024-11-18

## 2024-11-18 RX ORDER — AZITHROMYCIN 250 MG/1
TABLET, FILM COATED ORAL
Qty: 6 TABLET | Refills: 0 | Status: SHIPPED | OUTPATIENT
Start: 2024-11-18 | End: 2024-11-23

## 2024-11-18 NOTE — PROGRESS NOTES
Name: Cecilio Hebert      : 1960      MRN: [id number]  Encounter Provider: Mahin Rider DO  Encounter Date: 2024   Encounter department: [facility]  :  Assessment & Plan  Bronchitis  Wheezing throughout lung fields. Start Azithromycin and Tessalon. Follow up if worsening or not improved.  Orders:    azithromycin (Zithromax) 250 mg tablet; Take 2 tablets (500 mg total) by mouth daily for 1 day, THEN 1 tablet (250 mg total) daily for 4 days.    benzonatate (TESSALON PERLES) 100 mg capsule; Take 1 capsule (100 mg total) by mouth 3 (three) times a day as needed for cough           History of Present Illness     HPI  Head cold for two weeks. Drainage and cough. No fever. Taking Dayquil, Robitussin, all the cold medicine and nothing is working. Overall symptoms are constant, not improving or worsening.   Review of Systems       Objective   Pulse 76   Temp 98.4 °F (36.9 °C) (Temporal)   Resp 18   Wt 79.2 kg (174 lb 9.6 oz)   SpO2 97%   BMI 28.18 kg/m²      Physical Exam  Vitals reviewed.   Constitutional:       Appearance: Normal appearance.   HENT:      Right Ear: External ear normal.      Left Ear: External ear normal.      Nose: Nose normal.      Mouth/Throat:      Mouth: Mucous membranes are moist.      Pharynx: Oropharynx is clear. Posterior oropharyngeal erythema present.   Eyes:      Extraocular Movements: Extraocular movements intact.      Conjunctiva/sclera: Conjunctivae normal.   Cardiovascular:      Rate and Rhythm: Normal rate and regular rhythm.      Heart sounds: Normal heart sounds.   Pulmonary:      Effort: Pulmonary effort is normal.      Breath sounds: Wheezing present.   Abdominal:      General: Abdomen is flat.   Skin:     General: Skin is warm and dry.      Capillary Refill: Capillary refill takes less than 2 seconds.   Neurological:      Mental Status: He is alert.   Psychiatric:         Mood and Affect: Mood normal.         Behavior: Behavior normal.

## 2024-11-26 ENCOUNTER — TELEPHONE (OUTPATIENT)
Age: 64
End: 2024-11-26

## 2024-11-26 DIAGNOSIS — J40 BRONCHITIS: ICD-10-CM

## 2024-11-26 NOTE — TELEPHONE ENCOUNTER
Patient would like another round of the benzonatate (TESSALON PERLES) 100 mg capsule to be sent to his pharmacy of choice.     He's still coughing and experiencing drainage of the head as he states.      Please advise.

## 2024-11-27 RX ORDER — BENZONATATE 100 MG/1
100 CAPSULE ORAL 3 TIMES DAILY PRN
Qty: 20 CAPSULE | Refills: 0 | Status: SHIPPED | OUTPATIENT
Start: 2024-11-27 | End: 2024-11-29

## 2024-11-27 NOTE — TELEPHONE ENCOUNTER
Patient calling in to check status. He would like to use [facility].     Please call to discuss or when sent to pharmacy. Thank you!

## 2024-11-29 ENCOUNTER — OFFICE VISIT (OUTPATIENT)
Dept: FAMILY MEDICINE CLINIC | Facility: CLINIC | Age: 64
End: 2024-11-29
Payer: COMMERCIAL

## 2024-11-29 VITALS
OXYGEN SATURATION: 97 % | TEMPERATURE: 97.8 F | BODY MASS INDEX: 28.38 KG/M2 | HEIGHT: 66 IN | DIASTOLIC BLOOD PRESSURE: 82 MMHG | WEIGHT: 176.6 LBS | HEART RATE: 72 BPM | RESPIRATION RATE: 16 BRPM | SYSTOLIC BLOOD PRESSURE: 136 MMHG

## 2024-11-29 DIAGNOSIS — J98.01 BRONCHOSPASM: Primary | ICD-10-CM

## 2024-11-29 DIAGNOSIS — J01.90 ACUTE SINUSITIS, RECURRENCE NOT SPECIFIED, UNSPECIFIED LOCATION: ICD-10-CM

## 2024-11-29 DIAGNOSIS — Z12.5 PROSTATE CANCER SCREENING: ICD-10-CM

## 2024-11-29 DIAGNOSIS — E11.69 TYPE 2 DIABETES MELLITUS WITH OBESITY  (HCC): Chronic | ICD-10-CM

## 2024-11-29 DIAGNOSIS — E66.9 TYPE 2 DIABETES MELLITUS WITH OBESITY  (HCC): Chronic | ICD-10-CM

## 2024-11-29 PROCEDURE — 99213 OFFICE O/P EST LOW 20 MIN: CPT | Performed by: FAMILY MEDICINE

## 2024-11-29 RX ORDER — CEFUROXIME AXETIL 500 MG/1
500 TABLET ORAL 2 TIMES DAILY
Qty: 14 TABLET | Refills: 0 | Status: SHIPPED | OUTPATIENT
Start: 2024-11-29 | End: 2024-12-06

## 2024-11-29 RX ORDER — GLIPIZIDE 2.5 MG/1
2.5 TABLET, EXTENDED RELEASE ORAL 2 TIMES DAILY
Qty: 180 TABLET | Refills: 1 | Status: SHIPPED | OUTPATIENT
Start: 2024-11-29

## 2024-11-29 RX ORDER — FLUTICASONE PROPIONATE AND SALMETEROL 250; 50 UG/1; UG/1
1 POWDER RESPIRATORY (INHALATION) 2 TIMES DAILY
Qty: 60 BLISTER | Refills: 1 | Status: SHIPPED | OUTPATIENT
Start: 2024-11-29 | End: 2025-05-28

## 2024-11-29 NOTE — ASSESSMENT & PLAN NOTE
Lab Results   Component Value Date    HGBA1C 7.5 (A) 12/13/2023   Patient reports fasting blood glucose 108-1 10 range on low-dose glipizide.  Proceed with labs.    Orders:    glipiZIDE (GLUCOTROL XL) 2.5 mg 24 hr tablet; Take 1 tablet (2.5 mg total) by mouth 2 (two) times a day    Lipid Panel with Direct LDL reflex; Future    Hemoglobin A1C; Future    Albumin / creatinine urine ratio; Future

## 2024-11-29 NOTE — PROGRESS NOTES
Name: Cecilio Hebert      : 1960      MRN: [id number]  Encounter Provider: Kimberly Cross MD  Encounter Date: 2024   Encounter department: [facility]    Assessment & Plan  Bronchospasm    Orders:    Fluticasone-Salmeterol (Advair) 250-50 mcg/dose inhaler; Inhale 1 puff 2 (two) times a day Rinse mouth after use.    Acute sinusitis, recurrence not specified, unspecified location  Ongoing URI symptoms likely due to sinusitis.  Exam reveals some wheezing, lung exam is clear otherwise.  Partial but incomplete improvement after completion of Z-Fer that was prescribed on .  Start Ceftin and Advair discus.  Patient will contact me in the few days if symptoms or not improving  Orders:    cefuroxime (CEFTIN) 500 mg tablet; Take 1 tablet (500 mg total) by mouth 2 (two) times a day for 7 days    Type 2 diabetes mellitus with obesity  (HCC)    Lab Results   Component Value Date    HGBA1C 7.5 (A) 2023   Patient reports fasting blood glucose 108-1 10 range on low-dose glipizide.  Proceed with labs.    Orders:    glipiZIDE (GLUCOTROL XL) 2.5 mg 24 hr tablet; Take 1 tablet (2.5 mg total) by mouth 2 (two) times a day    Lipid Panel with Direct LDL reflex; Future    Hemoglobin A1C; Future    Albumin / creatinine urine ratio; Future    Prostate cancer screening    Orders:    PSA, Total Screen; Future         History of Present Illness     Linering cold s/o x 1 month.  Cold symptoms coincided with recurrence of colitis shortly after patient's trip to [place].   He was treated with course of steroids and reports significant improvement of GI symptoms.   Cough is persistent with mucus expectoration.   Patient has been afebrile.  He denies symptoms of chest tightness or shortness of breath.  Cough is deep, patient feels congested.  He was evaluated by Dr. Rider on 2024, completed course of antibiotic, Zithromax, felt better but never completely improved.  Tessalon Perles have not been  helpful.    Type 2 diabetes: Patient reports significant improvement of glycemic control once he restarted glipizide.  He uses 2.5 mg daily, fasting blood sugars have been in the range of 108-1 10.     URI   Associated symptoms include congestion and coughing.     Review of Systems   Constitutional: Negative.    HENT:  Positive for congestion and postnasal drip.    Eyes: Negative.    Respiratory:  Positive for cough. Negative for chest tightness and shortness of breath.    Cardiovascular: Negative.    Gastrointestinal: Negative.    Neurological: Negative.      Past Medical History:   Diagnosis Date    Brain cancer (HCC)     Colitis     Environmental allergies     Melanoma (HCC)      Past Surgical History:   Procedure Laterality Date    CT NEEDLE BIOPSY LUNG  7/25/2022    FL INJECTION RIGHT SHOULDER (ARTHROGRAM)  6/1/2021    HERNIA REPAIR      Last Assessed:  12/29/16     Family History   Problem Relation Age of Onset    Breast cancer Mother     Other Mother         Laryngeal cancer    Colon cancer Family      Social History     Tobacco Use    Smoking status: Never    Smokeless tobacco: Never   Vaping Use    Vaping status: Never Used   Substance and Sexual Activity    Alcohol use: Yes     Comment: social    Drug use: No    Sexual activity: Yes     Current Outpatient Medications on File Prior to Visit   Medication Sig    aspirin (ECOTRIN LOW STRENGTH) 81 mg EC tablet Take 81 mg by mouth daily    benzonatate (TESSALON PERLES) 100 mg capsule Take 1 capsule (100 mg total) by mouth 3 (three) times a day as needed for cough    Blood Glucose Monitoring Suppl (OneTouch Verio Reflect) w/Device KIT Check blood sugars once daily. Please substitute with appropriate alternative as covered by patient's insurance. Dx: E11.65    glipiZIDE (GLUCOTROL XL) 2.5 mg 24 hr tablet Take 1 tablet (2.5 mg total) by mouth 2 (two) times a day    glucose blood (OneTouch Verio) test strip Check blood sugars once daily. Please substitute with  "appropriate alternative as covered by patient's insurance. Dx: E11.65    hydrocortisone (CORTEF) 10 mg tablet Take 2 tablets by mouth in the morning and 1 tablet by mouth in the evening    Litetouch Pen Needles 31G X 5 MM MISC USE WITH HUMALOG KWIKPEN    OneTouch Delica Lancets 33G MISC Check blood sugars once daily. Please substitute with appropriate alternative as covered by patient's insurance. Dx: E11.65     Allergies   Allergen Reactions    Morphine Nausea Only, Headache and Hypertension     Intolerance     Immunization History   Administered Date(s) Administered    COVID-19 J&J ([employer]) vaccine 0.5 mL 05/07/2021, 12/02/2021, 12/03/2021    COVID-19 PFIZER VACCINE 0.3 ML IM 01/27/2021, 02/17/2021    Tdap 09/02/2021     Objective   /82 (BP Location: Left arm, Patient Position: Sitting, Cuff Size: Standard)   Pulse 72   Temp 97.8 °F (36.6 °C) (Temporal)   Resp 16   Ht 5' 6\" (1.676 m)   Wt 80.1 kg (176 lb 9.6 oz)   SpO2 97%   BMI 28.50 kg/m²     Physical Exam  Vitals and nursing note reviewed.   Constitutional:       General: He is not in acute distress.     Appearance: Normal appearance. He is well-developed. He is not ill-appearing.   HENT:      Head: Normocephalic and atraumatic.      Right Ear: Tympanic membrane and ear canal normal.      Left Ear: Tympanic membrane and ear canal normal.      Nose: Mucosal edema present.      Mouth/Throat:      Pharynx: Posterior oropharyngeal erythema present. No oropharyngeal exudate.      Comments: Copious PND  Eyes:      Conjunctiva/sclera: Conjunctivae normal.   Cardiovascular:      Rate and Rhythm: Normal rate and regular rhythm.      Heart sounds: Normal heart sounds. No murmur heard.  Pulmonary:      Effort: Pulmonary effort is normal. Prolonged expiration present. No respiratory distress (few scattered).      Breath sounds: Wheezing present. No rhonchi.   Musculoskeletal:         General: Normal range of motion.      Cervical back: Neck supple. No " rigidity.   Neurological:      General: No focal deficit present.      Mental Status: He is alert and oriented to person, place, and time.   Psychiatric:         Mood and Affect: Mood normal.         Behavior: Behavior normal.         Thought Content: Thought content normal.

## 2024-11-29 NOTE — TELEPHONE ENCOUNTER
Pt called to state he was requesting another round of azithromycin (Zithromax) 250 mg tablet and not the Tesslon that was sent to pharmacy.    Pt uses [facility]/pharmacy #[id number] - [place] HERNANDEZ - [address] [phone]

## 2024-12-13 ENCOUNTER — OFFICE VISIT (OUTPATIENT)
Dept: FAMILY MEDICINE CLINIC | Facility: CLINIC | Age: 64
End: 2024-12-13
Payer: COMMERCIAL

## 2024-12-13 VITALS
SYSTOLIC BLOOD PRESSURE: 134 MMHG | HEIGHT: 66 IN | HEART RATE: 91 BPM | TEMPERATURE: 97.8 F | RESPIRATION RATE: 16 BRPM | BODY MASS INDEX: 27.97 KG/M2 | DIASTOLIC BLOOD PRESSURE: 70 MMHG | WEIGHT: 174 LBS | OXYGEN SATURATION: 98 %

## 2024-12-13 DIAGNOSIS — H69.92 EUSTACHIAN TUBE DYSFUNCTION, LEFT: Primary | ICD-10-CM

## 2024-12-13 PROCEDURE — 99213 OFFICE O/P EST LOW 20 MIN: CPT | Performed by: FAMILY MEDICINE

## 2024-12-13 RX ORDER — METHYLPREDNISOLONE 4 MG/1
TABLET ORAL
Qty: 21 EACH | Refills: 0 | Status: SHIPPED | OUTPATIENT
Start: 2024-12-13

## 2024-12-13 RX ORDER — MOMETASONE FUROATE MONOHYDRATE 50 UG/1
2 SPRAY, METERED NASAL DAILY
Qty: 17 G | Refills: 1 | Status: SHIPPED | OUTPATIENT
Start: 2024-12-13

## 2024-12-13 NOTE — PATIENT INSTRUCTIONS
Zyrtec (cetirizine) 10 mg daily at bedtime, Nasonex 2 sprays each nostril once a day at bedtime -use them for 10 to 14 days  Decongestant nasal spray (Afrin or Synex)-use 2 sprays left nostril twice a day for 3 days and then stop  If after 3 days of decongestant spray your ears still feeling clogged-please start Medrol Dosepak.  If you are not feeling improved after completing Medrol Dosepak please message me

## 2024-12-13 NOTE — PROGRESS NOTES
Name: Cecilio Hebert      : 1960      MRN: [id number]  Encounter Provider: Kimberly Cross MD  Encounter Date: 2024   Encounter department: [facility]    Assessment & Plan  Eustachian tube dysfunction, left  Patient presents for evaluation of left ear discomfort described as clogged sensation.  He developed symptoms of mild lightheadedness/unsteadiness.  I suspect eustachian tube dysfunction following recent URI/sinusitis.  Overall patient has made a good recovery.  He is nontoxic and afebrile with no symptoms of cough, wheezing or sinus pressure.  We will start him on regimen of Zyrtec, Nasonex and decongestant spray.  Patient will not use decongestant spray longer than 3 days.  If his symptoms will not improve at that time he will start Medrol Dosepak.  Orders:  •  mometasone (NASONEX) 50 mcg/act nasal spray; 2 sprays into each nostril daily  •  methylPREDNISolone 4 MG tablet therapy pack; Use as directed on package       Patient Instructions   Zyrtec (cetirizine) 10 mg daily at bedtime, Nasonex 2 sprays each nostril once a day at bedtime -use them for 10 to 14 days  Decongestant nasal spray (Afrin or Synex)-use 2 sprays left nostril twice a day for 3 days and then stop  If after 3 days of decongestant spray your ears still feeling clogged-please start Medrol Dosepak.  If you are not feeling improved after completing Medrol Dosepak please message me    History of Present Illness     Patient presents for evaluation of left ear clogged sensation and lightheadedness/unsteadiness.  He denies symptoms of fever or dizziness.  No spinning sensation.  I saw patient 2 weeks ago for evaluation of recurrent upper respiratory infection symptoms.  He reports significant improvement after completing course of Ceftin that I have prescribed on 2024.  He used Advair discus on a few occasions and wheezing has resolved.  He developed clogged ear sensation approximately a week ago.  Denies  fever or headache.  No ear drainage.  No earache.  Patient has tried Tylenol cold with no improvement.  Overall he is feeling well.     Earache   Pertinent negatives include no headaches or sore throat.     Review of Systems   Constitutional:  Negative for fatigue and fever.   HENT:  Positive for ear pain. Negative for sinus pressure, sinus pain and sore throat.         As per HPI   Respiratory: Negative.     Cardiovascular: Negative.    Neurological:  Positive for light-headedness. Negative for dizziness and headaches.     Past Medical History:   Diagnosis Date   • Brain cancer (HCC)    • Colitis    • Environmental allergies    • Melanoma (HCC)      Past Surgical History:   Procedure Laterality Date   • CT NEEDLE BIOPSY LUNG  7/25/2022   • FL INJECTION RIGHT SHOULDER (ARTHROGRAM)  6/1/2021   • HERNIA REPAIR      Last Assessed:  12/29/16     Family History   Problem Relation Age of Onset   • Breast cancer Mother    • Other Mother         Laryngeal cancer   • Colon cancer Family      Social History     Tobacco Use   • Smoking status: Never   • Smokeless tobacco: Never   Vaping Use   • Vaping status: Never Used   Substance and Sexual Activity   • Alcohol use: Yes     Comment: social   • Drug use: No   • Sexual activity: Yes     Current Outpatient Medications on File Prior to Visit   Medication Sig   • aspirin (ECOTRIN LOW STRENGTH) 81 mg EC tablet Take 81 mg by mouth daily   • Blood Glucose Monitoring Suppl (OneTouch Verio Reflect) w/Device KIT Check blood sugars once daily. Please substitute with appropriate alternative as covered by patient's insurance. Dx: E11.65   • Fluticasone-Salmeterol (Advair) 250-50 mcg/dose inhaler Inhale 1 puff 2 (two) times a day Rinse mouth after use.   • glipiZIDE (GLUCOTROL XL) 2.5 mg 24 hr tablet Take 1 tablet (2.5 mg total) by mouth 2 (two) times a day   • glucose blood (OneTouch Verio) test strip Check blood sugars once daily. Please substitute with appropriate alternative as covered  "by patient's insurance. Dx: E11.65   • hydrocortisone (CORTEF) 10 mg tablet Take 2 tablets by mouth in the morning and 1 tablet by mouth in the evening   • Litetouch Pen Needles 31G X 5 MM MISC USE WITH HUMALOG KWIKPEN   • OneTouch Delica Lancets 33G MISC Check blood sugars once daily. Please substitute with appropriate alternative as covered by patient's insurance. Dx: E11.65     Allergies   Allergen Reactions   • Morphine Nausea Only, Headache and Hypertension     Intolerance     Immunization History   Administered Date(s) Administered   • COVID-19 J&J ([employer]) vaccine 0.5 mL 05/07/2021, 12/02/2021, 12/03/2021   • COVID-19 PFIZER VACCINE 0.3 ML IM 01/27/2021, 02/17/2021   • Tdap 09/02/2021     Objective   /70 (BP Location: Left arm, Patient Position: Sitting, Cuff Size: Large)   Pulse 91   Temp 97.8 °F (36.6 °C) (Temporal)   Resp 16   Ht 5' 6\" (1.676 m)   Wt 78.9 kg (174 lb)   SpO2 98%   BMI 28.08 kg/m²     Physical Exam  Vitals and nursing note reviewed.   Constitutional:       General: He is not in acute distress.     Appearance: Normal appearance. He is well-developed. He is not ill-appearing.   HENT:      Head: Normocephalic and atraumatic.      Right Ear: Tympanic membrane and ear canal normal.      Left Ear: Tympanic membrane and ear canal normal.      Nose: Congestion present.   Eyes:      Conjunctiva/sclera: Conjunctivae normal.   Neck:      Vascular: No carotid bruit.   Cardiovascular:      Rate and Rhythm: Normal rate and regular rhythm.      Heart sounds: Normal heart sounds. No murmur heard.  Pulmonary:      Effort: Pulmonary effort is normal. No respiratory distress.      Breath sounds: Normal breath sounds. No wheezing or rales.   Abdominal:      General: There is no abdominal bruit.   Musculoskeletal:         General: Normal range of motion.      Cervical back: Neck supple.   Neurological:      General: No focal deficit present.      Mental Status: He is alert and oriented to person, " place, and time.      Cranial Nerves: No cranial nerve deficit.   Psychiatric:         Mood and Affect: Mood normal.         Behavior: Behavior normal.

## 2024-12-16 ENCOUNTER — FOLLOW UP (OUTPATIENT)
Dept: URBAN - METROPOLITAN AREA CLINIC 6 | Facility: CLINIC | Age: 64
End: 2024-12-16

## 2024-12-16 PROCEDURE — 92285 EXTERNAL OCULAR PHOTOGRAPHY: CPT

## 2024-12-16 ASSESSMENT — VISUAL ACUITY: OD_SC: 20/25-2

## 2025-01-05 DIAGNOSIS — H69.92 EUSTACHIAN TUBE DYSFUNCTION, LEFT: ICD-10-CM

## 2025-01-07 RX ORDER — MOMETASONE FUROATE MONOHYDRATE 50 UG/1
SPRAY, METERED NASAL
Qty: 51 G | Refills: 1 | Status: SHIPPED | OUTPATIENT
Start: 2025-01-07

## 2025-03-04 ENCOUNTER — RESULTS FOLLOW-UP (OUTPATIENT)
Dept: FAMILY MEDICINE CLINIC | Facility: CLINIC | Age: 65
End: 2025-03-04

## 2025-03-04 LAB
ALBUMIN/CREAT UR: 7.4
CHOLEST SERPL-MCNC: 170 MG/DL
CHOLEST/HDLC SERPL: 3 {RATIO}
CREAT UR-MCNC: 161.6 MG/DL (ref 50–200)
EST. AVERAGE GLUCOSE BLD GHB EST-MCNC: 166 MG/DL
HBA1C MFR BLD: 7.4 %
HDLC SERPL-MCNC: 57 MG/DL (ref 23–92)
LDLC SERPL CALC-MCNC: 96 MG/DL
MICROALBUMIN UR-MCNC: 1.2 MG/DL
NONHDLC SERPL-MCNC: 113 MG/DL
SL AMB PSA, TOTAL: 2.86 NG/ML
TRIGL SERPL-MCNC: 85 MG/DL

## 2025-03-05 ENCOUNTER — TELEPHONE (OUTPATIENT)
Dept: FAMILY MEDICINE CLINIC | Facility: CLINIC | Age: 65
End: 2025-03-05

## 2025-03-05 NOTE — TELEPHONE ENCOUNTER
Please contact the patient or his wife.  Hemoglobin A1c is 7.4%.  Minimally changed from previous test of 7.5%.  Please double check if patient is taking glipizide once a day or twice a day?  Please let me know so I can adjust his medications in the interim    Patient should be scheduled for annual physical.    Rest of the blood work is normal.    Thank you

## 2025-08-19 ENCOUNTER — OFFICE VISIT (OUTPATIENT)
Dept: FAMILY MEDICINE CLINIC | Facility: CLINIC | Age: 65
End: 2025-08-19
Payer: COMMERCIAL

## 2025-08-19 VITALS
BODY MASS INDEX: 27.38 KG/M2 | HEART RATE: 72 BPM | RESPIRATION RATE: 16 BRPM | TEMPERATURE: 97.8 F | OXYGEN SATURATION: 98 % | DIASTOLIC BLOOD PRESSURE: 82 MMHG | SYSTOLIC BLOOD PRESSURE: 130 MMHG | HEIGHT: 66 IN | WEIGHT: 170.4 LBS

## 2025-08-19 DIAGNOSIS — E11.69 TYPE 2 DIABETES MELLITUS WITH OBESITY  (HCC): Chronic | ICD-10-CM

## 2025-08-19 DIAGNOSIS — E66.9 TYPE 2 DIABETES MELLITUS WITH OBESITY  (HCC): Chronic | ICD-10-CM

## 2025-08-19 DIAGNOSIS — E27.3 ADRENAL INSUFFICIENCY DUE TO CANCER THERAPY (HCC): ICD-10-CM

## 2025-08-19 DIAGNOSIS — C43.9 MALIGNANT MELANOMA, UNSPECIFIED SITE (HCC): Chronic | ICD-10-CM

## 2025-08-19 DIAGNOSIS — C79.31 MALIGNANT NEOPLASM METASTATIC TO BRAIN (HCC): ICD-10-CM

## 2025-08-19 DIAGNOSIS — Z00.00 ENCOUNTER FOR WELLNESS EXAMINATION IN ADULT: Primary | ICD-10-CM

## 2025-08-19 DIAGNOSIS — E03.2 HYPOTHYROIDISM DUE TO MEDICATION: ICD-10-CM

## 2025-08-19 PROCEDURE — 99397 PER PM REEVAL EST PAT 65+ YR: CPT | Performed by: FAMILY MEDICINE

## (undated) RX ORDER — BRIMONIDINE TARTRATE 2 MG/MG
1 SOLUTION/ DROPS OPHTHALMIC TWICE A DAY
Start: 2024-06-12

## (undated) RX ORDER — BIMATOPROST 0.1 MG/ML
1 SOLUTION/ DROPS OPHTHALMIC EVERY EVENING
Start: 2024-04-09

## (undated) RX ORDER — NEOMYCIN SULFATE, POLYMYXIN B SULFATE AND DEXAMETHASONE 3.5; 10000; 1 MG/G; [USP'U]/G; MG/G: 1/4 OINTMENT OPHTHALMIC TWICE A DAY